# Patient Record
Sex: FEMALE | Race: BLACK OR AFRICAN AMERICAN | Employment: FULL TIME | URBAN - METROPOLITAN AREA
[De-identification: names, ages, dates, MRNs, and addresses within clinical notes are randomized per-mention and may not be internally consistent; named-entity substitution may affect disease eponyms.]

---

## 2023-10-24 ENCOUNTER — TELEPHONE (OUTPATIENT)
Dept: OBSTETRICS AND GYNECOLOGY | Facility: CLINIC | Age: 33
End: 2023-10-24
Payer: COMMERCIAL

## 2023-11-29 ENCOUNTER — OFFICE VISIT (OUTPATIENT)
Dept: OBSTETRICS AND GYNECOLOGY | Facility: CLINIC | Age: 33
End: 2023-11-29
Payer: COMMERCIAL

## 2023-11-29 VITALS
WEIGHT: 118 LBS | BODY MASS INDEX: 18.52 KG/M2 | HEIGHT: 67 IN | SYSTOLIC BLOOD PRESSURE: 94 MMHG | DIASTOLIC BLOOD PRESSURE: 58 MMHG

## 2023-11-29 DIAGNOSIS — Z01.419 ENCOUNTER FOR ANNUAL ROUTINE GYNECOLOGICAL EXAMINATION: Primary | ICD-10-CM

## 2023-11-29 DIAGNOSIS — N94.3 PMS (PREMENSTRUAL SYNDROME): ICD-10-CM

## 2023-11-29 DIAGNOSIS — N94.6 DYSMENORRHEA: ICD-10-CM

## 2023-11-29 DIAGNOSIS — Z11.3 SCREEN FOR SEXUALLY TRANSMITTED DISEASES: ICD-10-CM

## 2023-11-29 DIAGNOSIS — Z11.51 ENCOUNTER FOR SCREENING FOR HUMAN PAPILLOMAVIRUS (HPV): ICD-10-CM

## 2023-11-29 DIAGNOSIS — N76.0 RECURRENT VAGINITIS: ICD-10-CM

## 2023-11-29 DIAGNOSIS — Z72.51 HIGH RISK HETEROSEXUAL BEHAVIOR: ICD-10-CM

## 2023-11-29 DIAGNOSIS — Z20.828 EXPOSURE TO HERPES SIMPLEX VIRUS (HSV): ICD-10-CM

## 2023-11-29 PROCEDURE — 99395 PREV VISIT EST AGE 18-39: CPT | Performed by: OBSTETRICS & GYNECOLOGY

## 2023-11-29 PROCEDURE — 87205 SMEAR GRAM STAIN: CPT | Performed by: OBSTETRICS & GYNECOLOGY

## 2023-11-29 PROCEDURE — 88175 CYTOPATH C/V AUTO FLUID REDO: CPT | Mod: TC | Performed by: OBSTETRICS & GYNECOLOGY

## 2023-11-29 PROCEDURE — 87800 DETECT AGNT MULT DNA DIREC: CPT | Performed by: OBSTETRICS & GYNECOLOGY

## 2023-11-29 PROCEDURE — 87624 HPV HI-RISK TYP POOLED RSLT: CPT | Performed by: OBSTETRICS & GYNECOLOGY

## 2023-11-29 PROCEDURE — 87661 TRICHOMONAS VAGINALIS AMPLIF: CPT | Mod: 59 | Performed by: OBSTETRICS & GYNECOLOGY

## 2023-11-29 PROCEDURE — 1036F TOBACCO NON-USER: CPT | Performed by: OBSTETRICS & GYNECOLOGY

## 2023-11-29 RX ORDER — IBUPROFEN 800 MG/1
800 TABLET ORAL EVERY 8 HOURS PRN
COMMUNITY
Start: 2022-10-19 | End: 2023-11-29 | Stop reason: SDUPTHER

## 2023-11-29 RX ORDER — IBUPROFEN 800 MG/1
800 TABLET ORAL EVERY 8 HOURS PRN
Qty: 30 TABLET | Refills: 3 | Status: SHIPPED | OUTPATIENT
Start: 2023-11-29

## 2023-11-29 RX ORDER — CYCLOBENZAPRINE HCL 10 MG
10 TABLET ORAL 3 TIMES DAILY PRN
COMMUNITY
Start: 2019-09-10

## 2023-11-29 ASSESSMENT — PATIENT HEALTH QUESTIONNAIRE - PHQ9
2. FEELING DOWN, DEPRESSED OR HOPELESS: NOT AT ALL
SUM OF ALL RESPONSES TO PHQ9 QUESTIONS 1 & 2: 0
1. LITTLE INTEREST OR PLEASURE IN DOING THINGS: NOT AT ALL

## 2023-11-29 ASSESSMENT — LIFESTYLE VARIABLES
AUDIT-C TOTAL SCORE: 1
HOW OFTEN DO YOU HAVE A DRINK CONTAINING ALCOHOL: MONTHLY OR LESS
HOW OFTEN DO YOU HAVE SIX OR MORE DRINKS ON ONE OCCASION: NEVER
HOW MANY STANDARD DRINKS CONTAINING ALCOHOL DO YOU HAVE ON A TYPICAL DAY: 1 OR 2
SKIP TO QUESTIONS 9-10: 1

## 2023-11-29 ASSESSMENT — ENCOUNTER SYMPTOMS
FREQUENCY: 0
SHORTNESS OF BREATH: 0
ABDOMINAL PAIN: 0
DIZZINESS: 0
VOMITING: 0
COLOR CHANGE: 0
NAUSEA: 0
UNEXPECTED WEIGHT CHANGE: 0
CHILLS: 0
FATIGUE: 0
HEADACHES: 0
DYSURIA: 0
FEVER: 0
COUGH: 0

## 2023-11-29 ASSESSMENT — PAIN SCALES - GENERAL: PAINLEVEL: 0-NO PAIN

## 2023-11-29 NOTE — PROGRESS NOTES
"Annual  Subjective   Michelle Higgins is a 33 y.o. female who is here for a routine exam.     Complaints: getting extremely emotional prior to menses, tearful/mood changes fast, doesn't want any ocps which have helped in past; recent exposure to HSV, wants tested, not having any outbreaks; h/o recurrent vagintis, dc at times wants all swabs done  Periods: regular  Dysmenorrhea: yes, painful    Current contraception: none  History of abnormal Pap smear: no  History of abnormal mammogram: no      OB History          1    Para   1    Term   1            AB        Living   1         SAB        IAB        Ectopic        Multiple        Live Births                      Review of Systems   Constitutional:  Negative for chills, fatigue, fever and unexpected weight change.   Respiratory:  Negative for cough and shortness of breath.    Gastrointestinal:  Negative for abdominal pain, nausea and vomiting.   Genitourinary:  Positive for menstrual problem and vaginal discharge. Negative for dyspareunia, dysuria, frequency, genital sores and pelvic pain.   Skin:  Negative for color change and rash.   Neurological:  Negative for dizziness and headaches.       Objective   BP 94/58   Ht 1.702 m (5' 7\")   Wt 53.5 kg (118 lb)   LMP 2023 (Exact Date)   BMI 18.48 kg/m²        General:   Alert and oriented, in no acute distress   Neck: Supple. No visible thyromegaly.    Breast/Axilla: Normal to palpation bilaterally without masses, skin changes, or nipple discharge.    Abdomen: Soft, non-tender, without masses or organomegaly   Vulva: Normal architecture without erythema, masses, or lesions.    Vagina: Normal mucosa without lesions, masses, or atrophy. No abnormal vaginal discharge.    Cervix: Normal without masses, lesions, or signs of cervicitis   Uterus: Normal, mobile, non-enlarged uterus   Adnexa: Normal without masses or lesions   Pelvic Floor normal   Psych Normal affect. Normal mood.      Assessment/Plan "   Problem List Items Addressed This Visit    None  Visit Diagnoses         Codes    Encounter for annual routine gynecological examination    -  Primary Z01.419    Relevant Orders    THINPREP PAP TEST    High risk heterosexual behavior     Z72.51    Relevant Orders    C. Trachomatis / N. Gonorrhoeae, Amplified Detection    Trichomonas vaginalis, Amplified    Vaginitis Gram Stain For Bacterial Vaginosis + Yeast    HIV 1/2 Antigen/Antibody Screen with Reflex to Confirmation    HSV1 IgG and HSV2 IgG    HSV Type I/II IgM Antibody    Syphilis Screen with Reflex    Hepatitis C Antibody    Hepatitis B Surface Antigen    Screen for sexually transmitted diseases     Z11.3    Relevant Orders    C. Trachomatis / N. Gonorrhoeae, Amplified Detection    Trichomonas vaginalis, Amplified    Vaginitis Gram Stain For Bacterial Vaginosis + Yeast    HIV 1/2 Antigen/Antibody Screen with Reflex to Confirmation    HSV1 IgG and HSV2 IgG    HSV Type I/II IgM Antibody    Syphilis Screen with Reflex    Hepatitis C Antibody    Hepatitis B Surface Antigen    Exposure to herpes simplex virus (HSV)     Z20.828    Relevant Orders    HSV1 IgG and HSV2 IgG    HSV Type I/II IgM Antibody    Encounter for screening for human papillomavirus (HPV)     Z11.51    Relevant Orders    THINPREP PAP TEST    Recurrent vaginitis     N76.0    Dysmenorrhea     N94.6    PMS (premenstrual syndrome)     N94.3          Discussed with patient continuing high-dose ibuprofen, adding omeprazole should she take continually and having any upper abdominal discomfort.  Patient also discussed other options to help with treatment of painful periods including some additional supplements, TENS unit, heating pad.  Discussed with patient's symptoms are likely more so related to PMS, and not PMDD.  Patient offered medication in preparation for her menses but she declines at this time.  Routine annual    Pap due today      Meghan Hill MD

## 2023-11-30 ENCOUNTER — LAB (OUTPATIENT)
Dept: LAB | Facility: LAB | Age: 33
End: 2023-11-30
Payer: COMMERCIAL

## 2023-11-30 DIAGNOSIS — Z11.3 SCREEN FOR SEXUALLY TRANSMITTED DISEASES: ICD-10-CM

## 2023-11-30 DIAGNOSIS — Z20.828 EXPOSURE TO HERPES SIMPLEX VIRUS (HSV): ICD-10-CM

## 2023-11-30 DIAGNOSIS — Z72.51 HIGH RISK HETEROSEXUAL BEHAVIOR: ICD-10-CM

## 2023-11-30 LAB
CLUE CELLS VAG LPF-#/AREA: NORMAL /[LPF]
NUGENT SCORE: 0
YEAST VAG WET PREP-#/AREA: NORMAL

## 2023-11-30 PROCEDURE — 86780 TREPONEMA PALLIDUM: CPT

## 2023-11-30 PROCEDURE — 86696 HERPES SIMPLEX TYPE 2 TEST: CPT

## 2023-11-30 PROCEDURE — 86694 HERPES SIMPLEX NES ANTBDY: CPT

## 2023-11-30 PROCEDURE — 87389 HIV-1 AG W/HIV-1&-2 AB AG IA: CPT

## 2023-11-30 PROCEDURE — 86803 HEPATITIS C AB TEST: CPT

## 2023-11-30 PROCEDURE — 86695 HERPES SIMPLEX TYPE 1 TEST: CPT

## 2023-11-30 PROCEDURE — 87340 HEPATITIS B SURFACE AG IA: CPT

## 2023-11-30 PROCEDURE — 36415 COLL VENOUS BLD VENIPUNCTURE: CPT

## 2023-12-01 LAB
HBV SURFACE AG SERPL QL IA: NONREACTIVE
HCV AB SER QL: NONREACTIVE
HERPES SIMPLEX VIRUS 1 IGG: 0.3 INDEX
HERPES SIMPLEX VIRUS 2 IGG: <0.2 INDEX
HIV 1+2 AB+HIV1 P24 AG SERPL QL IA: NONREACTIVE
T PALLIDUM AB SER QL: NONREACTIVE

## 2023-12-02 LAB
C TRACH RRNA SPEC QL NAA+PROBE: NEGATIVE
N GONORRHOEA DNA SPEC QL PROBE+SIG AMP: NEGATIVE
T VAGINALIS RRNA SPEC QL NAA+PROBE: NEGATIVE

## 2023-12-04 LAB — HSV1+2 IGM SER IA-ACNC: 0.72 IV

## 2023-12-14 ENCOUNTER — TELEMEDICINE (OUTPATIENT)
Dept: OBSTETRICS AND GYNECOLOGY | Facility: CLINIC | Age: 33
End: 2023-12-14
Payer: COMMERCIAL

## 2023-12-14 DIAGNOSIS — M62.838 MUSCLE SPASM: ICD-10-CM

## 2023-12-14 DIAGNOSIS — N94.6 DYSMENORRHEA: ICD-10-CM

## 2023-12-14 DIAGNOSIS — Z20.828 EXPOSURE TO HERPES SIMPLEX VIRUS (HSV): Primary | ICD-10-CM

## 2023-12-14 PROCEDURE — 99212 OFFICE O/P EST SF 10 MIN: CPT | Mod: GT | Performed by: OBSTETRICS & GYNECOLOGY

## 2023-12-14 PROCEDURE — 99212 OFFICE O/P EST SF 10 MIN: CPT | Performed by: OBSTETRICS & GYNECOLOGY

## 2023-12-14 RX ORDER — CYCLOBENZAPRINE HCL 5 MG
5 TABLET ORAL 3 TIMES DAILY PRN
Qty: 30 TABLET | Refills: 1 | Status: SHIPPED | OUTPATIENT
Start: 2023-12-14 | End: 2023-12-17

## 2023-12-14 ASSESSMENT — ENCOUNTER SYMPTOMS: DYSURIA: 0

## 2023-12-14 NOTE — PROGRESS NOTES
Subjective   Patient ID: Michelle Higgins is a 33 y.o. female who presents for No chief complaint on file..  Pt here to discuss labs: STDs rev'd and all wnl.  Patient at present not having any symptoms of anything concerning at this time.  Patient does not anticipate she would want a recheck of HSV titers in 6 months but she will let us know.  Patient also reports she started her period today and as always has been really intensely painful for her.  Ibuprofen and a muscle relaxant help, patient needs a refill of muscle relaxant today.  Patient is and continues to decline any hormonal forms of help for her menstrual cramps.      Review of Systems   Genitourinary:  Positive for menstrual problem and pelvic pain. Negative for dyspareunia and dysuria.       Objective   Physical Exam  Constitutional:       Appearance: Normal appearance.   Neurological:      Mental Status: She is alert.         Assessment/Plan   Problem List Items Addressed This Visit    None  Visit Diagnoses         Codes    Exposure to herpes simplex virus (HSV)    -  Primary Z20.828    Dysmenorrhea     N94.6    Relevant Medications    cyclobenzaprine (Flexeril) 5 mg tablet    Muscle spasm     M62.838    Relevant Medications    cyclobenzaprine (Flexeril) 5 mg tablet        Patient will call back if she desires repeat testing in 6 months for HSV.  Patient will continue to alternate ibuprofen and muscle relaxant for menstrual cramps.  Patient made aware of alternative treatments and workup for endometriosis should she desire including laparoscopy with possible fulguration of endometriosis should it be found.  Patient expressed understanding and states she will reach out if she desires to proceed in this manner.  Alternative forms to help with menstrual cramps discussed with patient including heat TENS unit and some supplements which patient is in the process of trying.         Meghan Hill MD 12/13/23 10:53 PM

## 2023-12-20 LAB
CYTOLOGY CMNT CVX/VAG CYTO-IMP: NORMAL
HPV HR 12 DNA GENITAL QL NAA+PROBE: POSITIVE
HPV HR GENOTYPES PNL CVX NAA+PROBE: POSITIVE
HPV16 DNA SPEC QL NAA+PROBE: NEGATIVE
HPV18 DNA SPEC QL NAA+PROBE: NEGATIVE
LAB AP HPV GENOTYPE QUESTION: YES
LAB AP HPV HR: NORMAL
LAB AP PAP ADDITIONAL TESTS: NORMAL
LABORATORY COMMENT REPORT: NORMAL
LMP START DATE: NORMAL
PATH REPORT.TOTAL CANCER: NORMAL

## 2024-01-02 ENCOUNTER — TELEMEDICINE (OUTPATIENT)
Dept: OBSTETRICS AND GYNECOLOGY | Facility: CLINIC | Age: 34
End: 2024-01-02
Payer: COMMERCIAL

## 2024-01-02 DIAGNOSIS — R87.618 PAP SMEAR ABNORMALITY OF CERVIX/HUMAN PAPILLOMAVIRUS (HPV) POSITIVE: Primary | ICD-10-CM

## 2024-01-02 PROCEDURE — 99212 OFFICE O/P EST SF 10 MIN: CPT | Mod: GT | Performed by: OBSTETRICS & GYNECOLOGY

## 2024-01-02 PROCEDURE — 99212 OFFICE O/P EST SF 10 MIN: CPT | Performed by: OBSTETRICS & GYNECOLOGY

## 2024-01-02 NOTE — PROGRESS NOTES
Subjective   Patient ID: Michelle Higgins is a 33 y.o. female who presents for No chief complaint on file..  33-year-old female here to discuss results for recent Pap.  Recent Pap showed negative Pap but positive HPV, other.  Patient denies any other symptoms at present and just wanted to discuss for clarity.        Review of Systems   Genitourinary:  Negative for dyspareunia, pelvic pain, vaginal bleeding, vaginal discharge and vaginal pain.       Objective   Physical Exam  Constitutional:       Appearance: Normal appearance.   Neurological:      Mental Status: She is alert.         Assessment/Plan   Problem List Items Addressed This Visit    None  Visit Diagnoses         Codes    Pap smear abnormality of cervix/human papillomavirus (HPV) positive    -  Primary R87.618        Discussed with patient normal course of HPV, encourage condoms HPV vaccination if not already received, as well as repeat HPV and Pap smear testing in 1 year.  Patient encouraged to do things to maximize immune system.         Meghan Hill MD 01/02/24 12:16 PM

## 2024-05-30 ENCOUNTER — TELEPHONE (OUTPATIENT)
Dept: OBSTETRICS AND GYNECOLOGY | Facility: CLINIC | Age: 34
End: 2024-05-30
Payer: COMMERCIAL

## 2024-05-30 NOTE — TELEPHONE ENCOUNTER
Est pt calling with c/o thick white discharge, odor and slight itching and irritation. Diflucan called into pharm on file.

## 2024-06-05 ENCOUNTER — TELEPHONE (OUTPATIENT)
Dept: OBSTETRICS AND GYNECOLOGY | Facility: CLINIC | Age: 34
End: 2024-06-05
Payer: COMMERCIAL

## 2024-06-05 NOTE — TELEPHONE ENCOUNTER
See prev TE; pt treated for yeast on 05/30. Pt states yeast sx's went away but now has vaginal odor and thin discharge. Called in Metronidazole to pharm on file. Advised appt if not better.

## 2024-10-08 ENCOUNTER — TELEPHONE (OUTPATIENT)
Dept: OBSTETRICS AND GYNECOLOGY | Facility: CLINIC | Age: 34
End: 2024-10-08
Payer: COMMERCIAL

## 2024-10-08 NOTE — TELEPHONE ENCOUNTER
EST PT calling in for appt for an STI check. Pt wants to be sure. Denies any symptoms at this time. Appt made for 10/15.

## 2024-10-15 ENCOUNTER — LAB (OUTPATIENT)
Dept: LAB | Facility: LAB | Age: 34
End: 2024-10-15
Payer: COMMERCIAL

## 2024-10-15 ENCOUNTER — OFFICE VISIT (OUTPATIENT)
Dept: OBSTETRICS AND GYNECOLOGY | Facility: CLINIC | Age: 34
End: 2024-10-15
Payer: COMMERCIAL

## 2024-10-15 VITALS
SYSTOLIC BLOOD PRESSURE: 102 MMHG | BODY MASS INDEX: 19.18 KG/M2 | WEIGHT: 122.2 LBS | HEIGHT: 67 IN | DIASTOLIC BLOOD PRESSURE: 65 MMHG

## 2024-10-15 DIAGNOSIS — Z11.3 SCREEN FOR STD (SEXUALLY TRANSMITTED DISEASE): ICD-10-CM

## 2024-10-15 DIAGNOSIS — Z72.51 UNPROTECTED SEX: ICD-10-CM

## 2024-10-15 DIAGNOSIS — Z72.51 UNPROTECTED SEX: Primary | ICD-10-CM

## 2024-10-15 DIAGNOSIS — N92.0 MENORRHAGIA WITH REGULAR CYCLE: ICD-10-CM

## 2024-10-15 LAB
B-HCG SERPL-ACNC: <2 MIU/ML
ERYTHROCYTE [DISTWIDTH] IN BLOOD BY AUTOMATED COUNT: 13 % (ref 11.5–14.5)
HCT VFR BLD AUTO: 35.2 % (ref 36–46)
HGB BLD-MCNC: 11.4 G/DL (ref 12–16)
MCH RBC QN AUTO: 28 PG (ref 26–34)
MCHC RBC AUTO-ENTMCNC: 32.4 G/DL (ref 32–36)
MCV RBC AUTO: 87 FL (ref 80–100)
NRBC BLD-RTO: 0 /100 WBCS (ref 0–0)
PLATELET # BLD AUTO: 209 X10*3/UL (ref 150–450)
RBC # BLD AUTO: 4.07 X10*6/UL (ref 4–5.2)
TSH SERPL-ACNC: 0.68 MIU/L (ref 0.44–3.98)
WBC # BLD AUTO: 3.9 X10*3/UL (ref 4.4–11.3)

## 2024-10-15 PROCEDURE — 84702 CHORIONIC GONADOTROPIN TEST: CPT

## 2024-10-15 PROCEDURE — 86695 HERPES SIMPLEX TYPE 1 TEST: CPT

## 2024-10-15 PROCEDURE — 1036F TOBACCO NON-USER: CPT | Performed by: OBSTETRICS & GYNECOLOGY

## 2024-10-15 PROCEDURE — 87340 HEPATITIS B SURFACE AG IA: CPT

## 2024-10-15 PROCEDURE — 84443 ASSAY THYROID STIM HORMONE: CPT

## 2024-10-15 PROCEDURE — 87661 TRICHOMONAS VAGINALIS AMPLIF: CPT | Mod: WESLAB | Performed by: OBSTETRICS & GYNECOLOGY

## 2024-10-15 PROCEDURE — 99213 OFFICE O/P EST LOW 20 MIN: CPT | Performed by: OBSTETRICS & GYNECOLOGY

## 2024-10-15 PROCEDURE — 86696 HERPES SIMPLEX TYPE 2 TEST: CPT

## 2024-10-15 PROCEDURE — 3008F BODY MASS INDEX DOCD: CPT | Performed by: OBSTETRICS & GYNECOLOGY

## 2024-10-15 PROCEDURE — 36415 COLL VENOUS BLD VENIPUNCTURE: CPT

## 2024-10-15 PROCEDURE — 87389 HIV-1 AG W/HIV-1&-2 AB AG IA: CPT

## 2024-10-15 PROCEDURE — 87491 CHLMYD TRACH DNA AMP PROBE: CPT | Mod: WESLAB | Performed by: OBSTETRICS & GYNECOLOGY

## 2024-10-15 PROCEDURE — 86803 HEPATITIS C AB TEST: CPT

## 2024-10-15 PROCEDURE — 84146 ASSAY OF PROLACTIN: CPT

## 2024-10-15 PROCEDURE — 85027 COMPLETE CBC AUTOMATED: CPT

## 2024-10-15 PROCEDURE — 86780 TREPONEMA PALLIDUM: CPT

## 2024-10-15 PROCEDURE — 87205 SMEAR GRAM STAIN: CPT | Mod: WESLAB | Performed by: OBSTETRICS & GYNECOLOGY

## 2024-10-15 ASSESSMENT — PAIN SCALES - GENERAL: PAINLEVEL: 0-NO PAIN

## 2024-10-15 ASSESSMENT — PATIENT HEALTH QUESTIONNAIRE - PHQ9
SUM OF ALL RESPONSES TO PHQ9 QUESTIONS 1 & 2: 0
2. FEELING DOWN, DEPRESSED OR HOPELESS: NOT AT ALL
1. LITTLE INTEREST OR PLEASURE IN DOING THINGS: NOT AT ALL

## 2024-10-15 ASSESSMENT — SOCIAL DETERMINANTS OF HEALTH (SDOH)
WITHIN THE LAST YEAR, HAVE YOU BEEN AFRAID OF YOUR PARTNER OR EX-PARTNER?: NO
WITHIN THE LAST YEAR, HAVE YOU BEEN KICKED, HIT, SLAPPED, OR OTHERWISE PHYSICALLY HURT BY YOUR PARTNER OR EX-PARTNER?: NO
WITHIN THE LAST YEAR, HAVE YOU BEEN HUMILIATED OR EMOTIONALLY ABUSED IN OTHER WAYS BY YOUR PARTNER OR EX-PARTNER?: NO
WITHIN THE LAST YEAR, HAVE TO BEEN RAPED OR FORCED TO HAVE ANY KIND OF SEXUAL ACTIVITY BY YOUR PARTNER OR EX-PARTNER?: NO

## 2024-10-15 ASSESSMENT — LIFESTYLE VARIABLES
HOW OFTEN DO YOU HAVE A DRINK CONTAINING ALCOHOL: MONTHLY OR LESS
HOW OFTEN DO YOU HAVE SIX OR MORE DRINKS ON ONE OCCASION: LESS THAN MONTHLY
AUDIT-C TOTAL SCORE: 2
SKIP TO QUESTIONS 9-10: 0
HOW MANY STANDARD DRINKS CONTAINING ALCOHOL DO YOU HAVE ON A TYPICAL DAY: 1 OR 2

## 2024-10-15 ASSESSMENT — ENCOUNTER SYMPTOMS
LOSS OF SENSATION IN FEET: 0
OCCASIONAL FEELINGS OF UNSTEADINESS: 0
DEPRESSION: 0

## 2024-10-15 NOTE — PROGRESS NOTES
Subjective   Michelle Higgins is a 34 y.o. who presents for sexually transmitted infection screening. Sexual history reviewed with the patient. STI exposures include  HSV in past, none currently . Patient reports previous history of the following STIs: none. Current symptoms include none.  Uses condoms intermittently. Pt also had a run of yeast infections in the summer and wants to make sure nothing is still there.    Pt also notes worsening dysmenorrhea and heaviness of period. Pt has tried a number of things to help and it does but not significantly. Pt had previously been opposed to hormnonal intervention but may be open to it now.     Social History     Substance and Sexual Activity   Sexual Activity Yes    Partners: Male    Birth control/protection: Condom Male     E-cigarette/Vaping       Questions Responses    E-cigarette/Vaping Use Never User    Passive Exposure No    Counseling Given No          E-cigarette/Vaping Substances       Questions Responses    Nicotine No    THC No    CBD No    Flavoring No          E-cigarette/Vaping Devices       Questions Responses    Disposable No    Pre-filled or Refillable Cartridge No    Refillable Tank No    Pre-filled Pod No          Gynecology History       Questions Responses    Ever had a Pap smear test? Yes    Comment:  Yes on 2023 (Age - 33y)     Date of last Pap test 2023    Comment:  2023 on 10/15/2024 (Age - 34y)     Ever had an abnormal Pap? No    Comment:  No on 2023 (Age - 33y)     Ever had an STI/STD No    Comment:  No on 2023 (Age - 33y)     Hx of Gonorrhea No    Comment:  No on 2023 (Age - 33y)     Hx of Chlamydia No    Comment:  No on 2023 (Age - 33y)     Hx of Syphilis No    Comment:  No on 2023 (Age - 33y)     Hx of Genital Herpes No    Comment:  No on 2023 (Age - 33y)     HIV Positive No    Comment:  No on 2023 (Age - 33y)     Recent HIV/AIDS Exposure No    Comment:  No on 2023 (Age  - 33y)           Exposure Assessment       Questions Responses    Have you or your partner traveled outside the [place] in the past six months? No    Comment:  No on 2023 (Age - 33y)     Recent exposure to chemicals/hazardous material/radiation No    Comment:  No on 2023 (Age - 33y)           Prior Pregnancy Outcomes       Questions Responses    Hx of Gestational Diabetes No    Comment:  No on 2023 (Age - 33y)     Hx of GBS No    Comment:  No on 2023 (Age - 33y)     Hx of  Birth No    Comment:  No on 2023 (Age - 33y)           Surgical History       Questions Responses    Patient Hx of Anesthesia Problems No    Comment:  No on 2023 (Age - 33y)     Family Hx of Anesthesia Problems No    Comment:  No on 2023 (Age - 33y)           Social & Substance History       Questions Responses    Any dietary restrictions or needs? No    Comment:  No on 2023 (Age - 33y)     Problems (transportation/job) prevent patient from keeping healthcare appointments No    Comment:  No on 2023 (Age - 33y)     Do you feel unsafe where you live? No    Comment:  No on 2023 (Age - 33y)     In the past year, have you been threatened, hit, slapped, or kicked by anyone you know? No    Comment:  No on 2023 (Age - 33y)     Has anyone forced you to perform any sexual act that you did not want to do? No    Comment:  No on 2023 (Age - 33y)     Are you exposed to second-hand smoke? No    Comment:  No on 2023 (Age - 33y)     Did you drink alcohol before your pregnancy? No    Comment:  No on 2023 (Age - 33y)     Did you stop drinking alcohol when you learned about the pregnancy? No    Comment:  No on 2023 (Age - 33y)     Do you drink alcoholic beverages now? No    Comment:  No on 2023 (Age - 33y)     Do you have a history or current issues of substance abuse? No    Comment:  No on 2023 (Age - 33y)             Objective   Physical Exam  Constitutional:        Appearance: Normal appearance.   HENT:      Head: Normocephalic and atraumatic.   Genitourinary:     General: Normal vulva.      Vagina: Normal.      Cervix: Normal.   Skin:     General: Skin is warm and dry.   Neurological:      General: No focal deficit present.      Mental Status: She is alert.   Psychiatric:         Attention and Perception: Attention and perception normal.         Mood and Affect: Mood and affect normal.         Speech: Speech normal.         Behavior: Behavior is cooperative.         Assessment/Plan   Problem List Items Addressed This Visit    None  Visit Diagnoses         Codes    Unprotected sex    -  Primary Z72.51    Relevant Orders    C. trachomatis / N. gonorrhoeae, Amplified    HIV 1/2 Antigen/Antibody Screen with Reflex to Confirmation    Hepatitis B Surface Antigen    Hepatitis C Antibody    Syphilis Screen with Reflex    Trichomonas vaginalis, Amplified    Vaginitis Gram Stain For Bacterial Vaginosis + Yeast    HSV1 IgG and HSV2 IgG    Screen for STD (sexually transmitted disease)     Z11.3    Relevant Orders    C. trachomatis / N. gonorrhoeae, Amplified    HIV 1/2 Antigen/Antibody Screen with Reflex to Confirmation    Hepatitis B Surface Antigen    Hepatitis C Antibody    Syphilis Screen with Reflex    Trichomonas vaginalis, Amplified    Vaginitis Gram Stain For Bacterial Vaginosis + Yeast    HSV1 IgG and HSV2 IgG    Menorrhagia with regular cycle     N92.0    Relevant Orders    CBC    TSH with reflex to Free T4 if abnormal    Human Chorionic Gonadotropin, Serum Quantitative    Prolactin    US PELVIS TRANSABDOMINAL WITH TRANSVAGINAL

## 2024-10-16 LAB
C TRACH RRNA SPEC QL NAA+PROBE: NEGATIVE
HBV SURFACE AG SERPL QL IA: NONREACTIVE
HCV AB SER QL: NONREACTIVE
HERPES SIMPLEX VIRUS 1 IGG: 0.3 INDEX
HERPES SIMPLEX VIRUS 2 IGG: <0.2 INDEX
HIV 1+2 AB+HIV1 P24 AG SERPL QL IA: NONREACTIVE
N GONORRHOEA DNA SPEC QL PROBE+SIG AMP: NEGATIVE
PROLACTIN SERPL-MCNC: 3.5 UG/L (ref 3–20)
T VAGINALIS RRNA SPEC QL NAA+PROBE: NEGATIVE
TREPONEMA PALLIDUM IGG+IGM AB [PRESENCE] IN SERUM OR PLASMA BY IMMUNOASSAY: NONREACTIVE

## 2024-10-20 LAB
CLUE CELLS VAG LPF-#/AREA: ABNORMAL /[LPF]
NUGENT SCORE: 8
YEAST VAG WET PREP-#/AREA: ABNORMAL

## 2024-10-21 ENCOUNTER — HOSPITAL ENCOUNTER (OUTPATIENT)
Dept: RADIOLOGY | Facility: HOSPITAL | Age: 34
Discharge: HOME | End: 2024-10-21
Payer: COMMERCIAL

## 2024-10-21 DIAGNOSIS — B96.89 BV (BACTERIAL VAGINOSIS): Primary | ICD-10-CM

## 2024-10-21 DIAGNOSIS — N76.0 BV (BACTERIAL VAGINOSIS): Primary | ICD-10-CM

## 2024-10-21 DIAGNOSIS — N92.0 MENORRHAGIA WITH REGULAR CYCLE: ICD-10-CM

## 2024-10-21 PROCEDURE — 76856 US EXAM PELVIC COMPLETE: CPT

## 2024-10-21 PROCEDURE — 76856 US EXAM PELVIC COMPLETE: CPT | Performed by: STUDENT IN AN ORGANIZED HEALTH CARE EDUCATION/TRAINING PROGRAM

## 2024-10-21 PROCEDURE — 76830 TRANSVAGINAL US NON-OB: CPT | Performed by: STUDENT IN AN ORGANIZED HEALTH CARE EDUCATION/TRAINING PROGRAM

## 2024-10-21 RX ORDER — METRONIDAZOLE 500 MG/1
500 TABLET ORAL 2 TIMES DAILY
Qty: 14 TABLET | Refills: 0 | Status: SHIPPED | OUTPATIENT
Start: 2024-10-21 | End: 2024-10-28

## 2024-11-07 ENCOUNTER — TELEMEDICINE (OUTPATIENT)
Dept: OBSTETRICS AND GYNECOLOGY | Facility: CLINIC | Age: 34
End: 2024-11-07

## 2024-11-07 DIAGNOSIS — N92.0 MENORRHAGIA WITH REGULAR CYCLE: Primary | ICD-10-CM

## 2024-11-07 PROCEDURE — 1036F TOBACCO NON-USER: CPT | Performed by: OBSTETRICS & GYNECOLOGY

## 2024-11-07 PROCEDURE — 99213 OFFICE O/P EST LOW 20 MIN: CPT | Performed by: OBSTETRICS & GYNECOLOGY

## 2024-11-07 RX ORDER — TRANEXAMIC ACID 650 MG/1
1300 TABLET ORAL 3 TIMES DAILY
Qty: 30 TABLET | Refills: 2 | Status: SHIPPED | OUTPATIENT
Start: 2024-11-07 | End: 2024-11-12

## 2024-11-07 NOTE — PROGRESS NOTES
Consent:  Verbal consent was requested and obtained from patient on this date for a telehealth visit to determine if a office visit would be necessary for the patient's complaint(s).      Subjective    HPI: Patient presents for televisit to discuss most recent labs and ultrasound results.  Ultrasound showing possible prominent parametrial vessels but otherwise normal ultrasound, dominant left ovarian follicle.    Past Medical History:   Diagnosis Date    Scoliosis         No past surgical history on file.     Social History     Tobacco Use    Smoking status: Never    Smokeless tobacco: Never   Vaping Use    Vaping status: Never Used   Substance Use Topics    Alcohol use: Yes     Alcohol/week: 1.0 standard drink of alcohol     Types: 1 Glasses of wine per week    Drug use: Yes     Types: Marijuana        Current Outpatient Medications   Medication Instructions    cyclobenzaprine (FLEXERIL) 10 mg, oral, 3 times daily PRN    cyclobenzaprine (FLEXERIL) 5 mg, oral, 3 times daily PRN    ibuprofen 800 mg, oral, Every 8 hours PRN    tranexamic acid (LYSTEDA) 1,300 mg, oral, 3 times daily      Objective    BSA: There is no height or weight on file to calculate BSA.  There were no vitals taken for this visit.     Physical Exam  General: AAOx3 in NAD   Psych: mood and affect are appropiate. Able to consent.     Assessment/Plan         Problem List Items Addressed This Visit    None  Visit Diagnoses         Codes    Menorrhagia with regular cycle    -  Primary N92.0        Patient will start the Emy to help with her heavy menses.  Patient reluctant to start hormonal methods but discussed may be next option for patient.     Treatment Plans         All new and/or current medications discussed and reviewed including side effects with patient/caregiver, Understanding:  Caregiver/Patient expressed understanding., Adherence:  Barriers to adherence identified and discussed if present.

## 2024-11-21 ENCOUNTER — TELEPHONE (OUTPATIENT)
Dept: OBSTETRICS AND GYNECOLOGY | Facility: CLINIC | Age: 34
End: 2024-11-21

## 2024-11-21 NOTE — TELEPHONE ENCOUNTER
Est pt calling with c/o yeast infection sx's due to using new soap. Called in Diflucan to pharm on file. Advised appt if not better.

## 2024-12-22 ENCOUNTER — TELEPHONE (OUTPATIENT)
Dept: OBSTETRICS AND GYNECOLOGY | Facility: HOSPITAL | Age: 34
End: 2024-12-22

## 2025-02-03 ENCOUNTER — INITIAL PRENATAL (OUTPATIENT)
Dept: OBSTETRICS AND GYNECOLOGY | Facility: CLINIC | Age: 35
End: 2025-02-03
Payer: COMMERCIAL

## 2025-02-03 VITALS
DIASTOLIC BLOOD PRESSURE: 65 MMHG | BODY MASS INDEX: 20.37 KG/M2 | WEIGHT: 129.8 LBS | OXYGEN SATURATION: 100 % | HEART RATE: 80 BPM | SYSTOLIC BLOOD PRESSURE: 114 MMHG | HEIGHT: 67 IN

## 2025-02-03 DIAGNOSIS — Z32.01 ENCOUNTER FOR PREGNANCY TEST, RESULT POSITIVE (HHS-HCC): ICD-10-CM

## 2025-02-03 DIAGNOSIS — O36.80X0 PREGNANCY WITH INCONCLUSIVE FETAL VIABILITY, SINGLE OR UNSPECIFIED FETUS: ICD-10-CM

## 2025-02-03 DIAGNOSIS — O09.521 MULTIGRAVIDA OF ADVANCED MATERNAL AGE IN FIRST TRIMESTER (HHS-HCC): ICD-10-CM

## 2025-02-03 DIAGNOSIS — Z34.90 PRENATAL CARE, ANTEPARTUM (HHS-HCC): ICD-10-CM

## 2025-02-03 DIAGNOSIS — N91.4 SECONDARY OLIGOMENORRHEA: Primary | ICD-10-CM

## 2025-02-03 DIAGNOSIS — Z13.79 GENETIC TESTING: ICD-10-CM

## 2025-02-03 LAB
POC APPEARANCE, URINE: CLEAR
POC BILIRUBIN, URINE: NEGATIVE
POC BLOOD, URINE: NEGATIVE
POC COLOR, URINE: YELLOW
POC GLUCOSE, URINE: NEGATIVE MG/DL
POC KETONES, URINE: NEGATIVE MG/DL
POC LEUKOCYTES, URINE: NEGATIVE
POC NITRITE,URINE: NEGATIVE
POC PH, URINE: 6.5 PH
POC PROTEIN, URINE: ABNORMAL MG/DL
POC SPECIFIC GRAVITY, URINE: 1.02
POC UROBILINOGEN, URINE: 0.2 EU/DL
PREGNANCY TEST URINE, POC: POSITIVE

## 2025-02-03 PROCEDURE — 81025 URINE PREGNANCY TEST: CPT | Performed by: OBSTETRICS & GYNECOLOGY

## 2025-02-03 PROCEDURE — 76817 TRANSVAGINAL US OBSTETRIC: CPT | Performed by: OBSTETRICS & GYNECOLOGY

## 2025-02-03 PROCEDURE — 81003 URINALYSIS AUTO W/O SCOPE: CPT | Mod: QW | Performed by: OBSTETRICS & GYNECOLOGY

## 2025-02-03 PROCEDURE — 99214 OFFICE O/P EST MOD 30 MIN: CPT | Performed by: OBSTETRICS & GYNECOLOGY

## 2025-02-03 ASSESSMENT — ENCOUNTER SYMPTOMS
OCCASIONAL FEELINGS OF UNSTEADINESS: 0
LOSS OF SENSATION IN FEET: 0
DEPRESSION: 0

## 2025-02-03 ASSESSMENT — PAIN SCALES - GENERAL: PAINLEVEL_OUTOF10: 0 - NO PAIN

## 2025-02-03 ASSESSMENT — PAIN - FUNCTIONAL ASSESSMENT: PAIN_FUNCTIONAL_ASSESSMENT: 0-10

## 2025-02-03 NOTE — PROGRESS NOTES
Subjective   Patient ID [id number]   Michelle Higgins is a 34 y.o.  at 8w2d with a working estimated date of delivery of 2025, by Last Menstrual Period who presents for an initial prenatal visit.     Her pregnancy is complicated by:  Scoliosis, no rods, no prob w/getting epidural last pregnancy  AMA at time of delivery, NIPT pend    OB History    Para Term  AB Living   2 1 1     1   SAB IAB Ectopic Multiple Live Births           1      # Outcome Date GA Lbr Jaskaran/2nd Weight Sex Type Anes PTL Lv   2 Current            1 Term 17 39w3d  3.912 kg F Vag-Spont EPI N SABI      Birth Comments: Mother A+, GBS negativeNBS WNL          Objective   Physical Exam  Weight: 58.9 kg (129 lb 12.8 oz)  Pregravid BMI: Could not be calculated  BP: 114/65          PROCEDURE:  OB/GYN Transvaginal U/S    Intrauterine - yes  Yolk Sac - yes  Fetal Pole- single  FHT  yes  EDC  2025  CRL  8w0d  Impression: single live IUP, CRL c/w LMP will use JYOTI based on LMP     OBGyn Exam    General Physical Exam    HEENT: normal Heart: normal Skin: normal   Thyroid: normal Lungs: normal Extremities: normal   Lymph Nodes: normal Breasts: normal Neurological: normal   Abdomen: normal        Pelvic Exam    Vulva: normal Vagina: normal   Cervix: normal Adnexa: normal   Rectum: normal Spines: average   Subpubic Arch: normal       Prenatal Labs  Results for orders placed or performed in visit on 25   POCT UA Automated manually resulted    Collection Time: 25  9:52 AM   Result Value Ref Range    POC Color, Urine Yellow Straw, Yellow, Light-Yellow    POC Appearance, Urine Clear Clear    POC Glucose, Urine NEGATIVE NEGATIVE mg/dl    POC Bilirubin, Urine NEGATIVE NEGATIVE    POC Ketones, Urine NEGATIVE NEGATIVE mg/dl    POC Specific Gravity, Urine 1.025 1.005 - 1.035    POC Blood, Urine NEGATIVE NEGATIVE    POC PH, Urine 6.5 No Reference Range Established PH    POC Protein, Urine TRACE (A) NEGATIVE mg/dl    POC  Urobilinogen, Urine 0.2 0.2, 1.0 EU/DL    Poc Nitrite, Urine NEGATIVE NEGATIVE    POC Leukocytes, Urine NEGATIVE NEGATIVE   POCT pregnancy, urine manually resulted    Collection Time: 02/03/25  9:52 AM   Result Value Ref Range    Preg Test, Ur Positive (A) Negative         Assessment/Plan   Problem List Items Addressed This Visit    None  Visit Diagnoses         Codes    Secondary oligomenorrhea    -  Primary N91.4    Relevant Orders    POCT pregnancy, urine manually resulted (Completed)    Encounter for pregnancy test, result positive ([facility])     Z32.01    Relevant Orders    POCT UA Automated manually resulted (Completed)    Urine culture    C. trachomatis / N. gonorrhoeae, Amplified, Urogenital    Pregnancy with inconclusive fetal viability, single or unspecified fetus     O36.80X0    Relevant Orders    Point of Care Ultrasound (Completed)    Prenatal care, antepartum ([facility])     Z34.90    Relevant Orders    CBC Anemia Panel With Reflex,Pregnancy    Hepatitis B surface antigen    Hepatitis C antibody    HIV 1/2 Antigen/Antibody Screen with Reflex to Confirmation    Rubella Antibody, IgG    Syphilis Screen with Reflex    Hgb  A1C    Type And Screen Is this order related to pregnancy or an upcoming surgery? No    Varicella Zoster Antibody, IgG    US OB NT (NUCHAL translucency)    US MAC OB imaging order    Multigravida of advanced maternal age in first trimester ([facility])     O09.521    Relevant Orders    Cre[facility] Prequel Prenatal Screen    Genetic testing     Z13.79    Relevant Orders    Cre[facility] Foresight Carrier Screen            Immunizations: discussed, pt declines  Prenatal Labs ordered  Daily prenatal vitamins prescribed  First trimester screening and second trimester screening discussed.  Follow up in 4 weeks for return OB visit.

## 2025-02-05 LAB
BACTERIA UR CULT: NORMAL
C TRACH RRNA SPEC QL NAA+PROBE: NOT DETECTED
N GONORRHOEA RRNA SPEC QL NAA+PROBE: NOT DETECTED
QUEST GC CT AMPLIFIED (ALWAYS MESSAGE): NORMAL

## 2025-02-06 ENCOUNTER — TELEPHONE (OUTPATIENT)
Dept: OBSTETRICS AND GYNECOLOGY | Facility: CLINIC | Age: 35
End: 2025-02-06
Payer: COMMERCIAL

## 2025-02-06 NOTE — TELEPHONE ENCOUNTER
Est OB around 8wks calling for medication for nausea. Pt has not yet tried anything. Reviewed protocol unisom/vit b6.

## 2025-02-19 ENCOUNTER — TELEPHONE (OUTPATIENT)
Dept: OBSTETRICS AND GYNECOLOGY | Facility: CLINIC | Age: 35
End: 2025-02-19
Payer: COMMERCIAL

## 2025-02-19 DIAGNOSIS — O21.9 NAUSEA AND VOMITING IN PREGNANCY PRIOR TO 22 WEEKS GESTATION: Primary | ICD-10-CM

## 2025-02-19 RX ORDER — ONDANSETRON 4 MG/1
4 TABLET, FILM COATED ORAL EVERY 8 HOURS PRN
Qty: 30 TABLET | Refills: 1 | Status: SHIPPED | OUTPATIENT
Start: 2025-02-19 | End: 2025-03-21

## 2025-02-19 NOTE — TELEPHONE ENCOUNTER
Est OB around 10wks calling stating Unisom and Vitamin B6 is not working, she has tried it for the last 2wks with no relief. Pt asking for rx to be sent to pharm.

## 2025-03-04 ENCOUNTER — HOSPITAL ENCOUNTER (OUTPATIENT)
Dept: RADIOLOGY | Facility: CLINIC | Age: 35
Discharge: HOME | End: 2025-03-04
Payer: COMMERCIAL

## 2025-03-04 DIAGNOSIS — Z34.90 PRENATAL CARE, ANTEPARTUM (HHS-HCC): ICD-10-CM

## 2025-03-04 PROCEDURE — 76813 OB US NUCHAL MEAS 1 GEST: CPT

## 2025-03-13 ENCOUNTER — LAB (OUTPATIENT)
Dept: LAB | Facility: HOSPITAL | Age: 35
End: 2025-03-13
Payer: COMMERCIAL

## 2025-03-13 ENCOUNTER — APPOINTMENT (OUTPATIENT)
Dept: LAB | Facility: HOSPITAL | Age: 35
End: 2025-03-13
Payer: COMMERCIAL

## 2025-03-13 ENCOUNTER — ROUTINE PRENATAL (OUTPATIENT)
Dept: OBSTETRICS AND GYNECOLOGY | Facility: CLINIC | Age: 35
End: 2025-03-13
Payer: COMMERCIAL

## 2025-03-13 VITALS
WEIGHT: 130.4 LBS | SYSTOLIC BLOOD PRESSURE: 97 MMHG | HEART RATE: 112 BPM | BODY MASS INDEX: 20.47 KG/M2 | DIASTOLIC BLOOD PRESSURE: 69 MMHG | HEIGHT: 67 IN | OXYGEN SATURATION: 99 %

## 2025-03-13 DIAGNOSIS — O09.521 MULTIGRAVIDA OF ADVANCED MATERNAL AGE IN FIRST TRIMESTER (HHS-HCC): Primary | ICD-10-CM

## 2025-03-13 DIAGNOSIS — Z3A.13 13 WEEKS GESTATION OF PREGNANCY (HHS-HCC): ICD-10-CM

## 2025-03-13 DIAGNOSIS — O21.9 NAUSEA AND VOMITING IN PREGNANCY PRIOR TO 22 WEEKS GESTATION: ICD-10-CM

## 2025-03-13 LAB
ABO GROUP (TYPE) IN BLOOD: NORMAL
ANTIBODY SCREEN: NORMAL
ERYTHROCYTE [DISTWIDTH] IN BLOOD BY AUTOMATED COUNT: 13.5 % (ref 11.5–14.5)
HCT VFR BLD AUTO: 29.8 % (ref 36–46)
HGB BLD-MCNC: 9.5 G/DL (ref 12–16)
MCH RBC QN AUTO: 28.3 PG (ref 26–34)
MCHC RBC AUTO-ENTMCNC: 31.9 G/DL (ref 32–36)
MCV RBC AUTO: 89 FL (ref 80–100)
NRBC BLD-RTO: 0 /100 WBCS (ref 0–0)
PLATELET # BLD AUTO: 186 X10*3/UL (ref 150–450)
POC APPEARANCE, URINE: CLEAR
POC BILIRUBIN, URINE: NEGATIVE
POC BLOOD, URINE: NEGATIVE
POC COLOR, URINE: YELLOW
POC GLUCOSE, URINE: NEGATIVE MG/DL
POC KETONES, URINE: ABNORMAL MG/DL
POC LEUKOCYTES, URINE: ABNORMAL
POC NITRITE,URINE: NEGATIVE
POC PH, URINE: 5.5 PH
POC PROTEIN, URINE: ABNORMAL MG/DL
POC SPECIFIC GRAVITY, URINE: >=1.03
POC UROBILINOGEN, URINE: 1 EU/DL
RBC # BLD AUTO: 3.36 X10*6/UL (ref 4–5.2)
RH FACTOR (ANTIGEN D): NORMAL
WBC # BLD AUTO: 3.9 X10*3/UL (ref 4.4–11.3)

## 2025-03-13 PROCEDURE — 82728 ASSAY OF FERRITIN: CPT

## 2025-03-13 PROCEDURE — 85027 COMPLETE CBC AUTOMATED: CPT

## 2025-03-13 PROCEDURE — 81003 URINALYSIS AUTO W/O SCOPE: CPT | Performed by: OBSTETRICS & GYNECOLOGY

## 2025-03-13 PROCEDURE — 86850 RBC ANTIBODY SCREEN: CPT

## 2025-03-13 PROCEDURE — 82746 ASSAY OF FOLIC ACID SERUM: CPT

## 2025-03-13 PROCEDURE — 82607 VITAMIN B-12: CPT

## 2025-03-13 PROCEDURE — 86901 BLOOD TYPING SEROLOGIC RH(D): CPT

## 2025-03-13 PROCEDURE — 99213 OFFICE O/P EST LOW 20 MIN: CPT | Mod: TH | Performed by: OBSTETRICS & GYNECOLOGY

## 2025-03-13 PROCEDURE — 99213 OFFICE O/P EST LOW 20 MIN: CPT | Performed by: OBSTETRICS & GYNECOLOGY

## 2025-03-13 PROCEDURE — 86900 BLOOD TYPING SEROLOGIC ABO: CPT

## 2025-03-13 PROCEDURE — 83550 IRON BINDING TEST: CPT

## 2025-03-13 RX ORDER — PROMETHAZINE HYDROCHLORIDE 25 MG/1
25 TABLET ORAL EVERY 6 HOURS PRN
Qty: 30 TABLET | Refills: 1 | Status: SHIPPED | OUTPATIENT
Start: 2025-03-13

## 2025-03-13 ASSESSMENT — LIFESTYLE VARIABLES
HOW OFTEN DO YOU HAVE A DRINK CONTAINING ALCOHOL: NEVER
SKIP TO QUESTIONS 9-10: 1
AUDIT-C TOTAL SCORE: 0
HOW MANY STANDARD DRINKS CONTAINING ALCOHOL DO YOU HAVE ON A TYPICAL DAY: PATIENT DOES NOT DRINK
HOW OFTEN DO YOU HAVE SIX OR MORE DRINKS ON ONE OCCASION: NEVER

## 2025-03-13 ASSESSMENT — PATIENT HEALTH QUESTIONNAIRE - PHQ9
2. FEELING DOWN, DEPRESSED OR HOPELESS: NOT AT ALL
1. LITTLE INTEREST OR PLEASURE IN DOING THINGS: NOT AT ALL
SUM OF ALL RESPONSES TO PHQ9 QUESTIONS 1 & 2: 0

## 2025-03-13 ASSESSMENT — ENCOUNTER SYMPTOMS
DEPRESSION: 0
OCCASIONAL FEELINGS OF UNSTEADINESS: 0
LOSS OF SENSATION IN FEET: 0

## 2025-03-13 ASSESSMENT — PAIN - FUNCTIONAL ASSESSMENT: PAIN_FUNCTIONAL_ASSESSMENT: 0-10

## 2025-03-13 ASSESSMENT — PAIN SCALES - GENERAL: PAINLEVEL_OUTOF10: 0 - NO PAIN

## 2025-03-13 NOTE — PROGRESS NOTES
Subjective   Patient ID [id number]   Michelle Higgins is a 34 y.o.  at 13w5d with a working estimated date of delivery of 2025, by Last Menstrual Period who presents for a routine prenatal visit.     Her pregnancy is complicated by:  Scoliosis, no rods, no prob w/getting epidural last pregnancy  AMA at time of delivery, NIPT pend  Increased risk of preE, start asa 12-16wks    Objective   Physical Exam  Weight: 59.1 kg (130 lb 6.4 oz)  Pregravid BMI: 20.20  BP: 97/69  Fetal Heart Rate: 165               Prenatal Labs  Urine dip:  Results for orders placed or performed in visit on 25   Urine culture    Collection Time: 25  9:30 AM    Specimen: Clean Catch/Voided; Urine   Result Value Ref Range    CULTURE, URINE, ROUTINE SEE NOTE    C. trachomatis / N. gonorrhoeae, Amplified, Urogenital    Collection Time: 25  9:30 AM   Result Value Ref Range    CHLAMYDIA TRACHOMATIS RNA, TMA, UROGENITAL NOT DETECTED NOT DETECTED    NEISSERIA GONORRHOEAE RNA, TMA, UROGENITAL NOT DETECTED NOT DETECTED    (Always Message)     POCT UA Automated manually resulted    Collection Time: 25  9:52 AM   Result Value Ref Range    POC Color, Urine Yellow Straw, Yellow, Light-Yellow    POC Appearance, Urine Clear Clear    POC Glucose, Urine NEGATIVE NEGATIVE mg/dl    POC Bilirubin, Urine NEGATIVE NEGATIVE    POC Ketones, Urine NEGATIVE NEGATIVE mg/dl    POC Specific Gravity, Urine 1.025 1.005 - 1.035    POC Blood, Urine NEGATIVE NEGATIVE    POC PH, Urine 6.5 No Reference Range Established PH    POC Protein, Urine TRACE (A) NEGATIVE mg/dl    POC Urobilinogen, Urine 0.2 0.2, 1.0 EU/DL    Poc Nitrite, Urine NEGATIVE NEGATIVE    POC Leukocytes, Urine NEGATIVE NEGATIVE   POCT pregnancy, urine manually resulted    Collection Time: 25  9:52 AM   Result Value Ref Range    Preg Test, Ur Positive (A) Negative       Assessment/Plan   {Assess/Plan SmartLinks (Optional):19106}    Continue prenatal vitamin.  Labs  reviewed.  Schedule anatomy ultrasound.  Follow up in 4 weeks for a routine prenatal visit.

## 2025-03-14 LAB
COMMENTS - MP RESULT TYPE: NORMAL
COMMENTS - MP RESULT TYPE: NORMAL
EST. AVERAGE GLUCOSE BLD GHB EST-MCNC: 100 MG/DL
EST. AVERAGE GLUCOSE BLD GHB EST-SCNC: 5.5 MMOL/L
FERRITIN SERPL-MCNC: 265 NG/ML
FOLATE SERPL-MCNC: 14.5 NG/ML
HBA1C MFR BLD: 5.1 % OF TOTAL HGB
IRON SATN MFR SERPL: 49 %
IRON SERPL-MCNC: 173 UG/DL
REFLEX ADDED, ANEMIA PANEL: NORMAL
SCAN RESULT: NORMAL
SCAN RESULT: NORMAL
TIBC SERPL-MCNC: 356 UG/DL
UIBC SERPL-MCNC: 183 UG/DL
VIT B12 SERPL-MCNC: 375 PG/ML
VZV IGG SER IA-ACNC: 4.2 S/CO

## 2025-04-17 ENCOUNTER — ROUTINE PRENATAL (OUTPATIENT)
Dept: OBSTETRICS AND GYNECOLOGY | Facility: CLINIC | Age: 35
End: 2025-04-17
Payer: COMMERCIAL

## 2025-04-17 VITALS
HEIGHT: 67 IN | BODY MASS INDEX: 21.91 KG/M2 | SYSTOLIC BLOOD PRESSURE: 115 MMHG | DIASTOLIC BLOOD PRESSURE: 74 MMHG | WEIGHT: 139.6 LBS

## 2025-04-17 DIAGNOSIS — M54.9 BACK PAIN AFFECTING PREGNANCY: ICD-10-CM

## 2025-04-17 DIAGNOSIS — O09.522 MULTIGRAVIDA OF ADVANCED MATERNAL AGE IN SECOND TRIMESTER (HHS-HCC): Primary | ICD-10-CM

## 2025-04-17 DIAGNOSIS — M41.27 OTHER IDIOPATHIC SCOLIOSIS, LUMBOSACRAL REGION: ICD-10-CM

## 2025-04-17 DIAGNOSIS — O99.891 BACK PAIN AFFECTING PREGNANCY: ICD-10-CM

## 2025-04-17 DIAGNOSIS — Z3A.18 18 WEEKS GESTATION OF PREGNANCY (HHS-HCC): ICD-10-CM

## 2025-04-17 LAB
POC APPEARANCE, URINE: CLEAR
POC BILIRUBIN, URINE: NEGATIVE
POC BLOOD, URINE: NEGATIVE
POC COLOR, URINE: YELLOW
POC GLUCOSE, URINE: NEGATIVE MG/DL
POC KETONES, URINE: NEGATIVE MG/DL
POC LEUKOCYTES, URINE: ABNORMAL
POC NITRITE,URINE: NEGATIVE
POC PH, URINE: 5.5 PH
POC PROTEIN, URINE: ABNORMAL MG/DL
POC SPECIFIC GRAVITY, URINE: >=1.03
POC UROBILINOGEN, URINE: 0.2 EU/DL

## 2025-04-17 PROCEDURE — 99213 OFFICE O/P EST LOW 20 MIN: CPT | Performed by: OBSTETRICS & GYNECOLOGY

## 2025-04-17 PROCEDURE — 81003 URINALYSIS AUTO W/O SCOPE: CPT | Mod: QW | Performed by: OBSTETRICS & GYNECOLOGY

## 2025-04-17 PROCEDURE — 99213 OFFICE O/P EST LOW 20 MIN: CPT | Mod: TH | Performed by: OBSTETRICS & GYNECOLOGY

## 2025-04-17 ASSESSMENT — ANXIETY QUESTIONNAIRES
7. FEELING AFRAID AS IF SOMETHING AWFUL MIGHT HAPPEN: NOT AT ALL
4. TROUBLE RELAXING: NOT AT ALL
3. WORRYING TOO MUCH ABOUT DIFFERENT THINGS: NOT AT ALL
GAD7 TOTAL SCORE: 3
1. FEELING NERVOUS, ANXIOUS, OR ON EDGE: SEVERAL DAYS
5. BEING SO RESTLESS THAT IT IS HARD TO SIT STILL: NOT AT ALL
IF YOU CHECKED OFF ANY PROBLEMS ON THIS QUESTIONNAIRE, HOW DIFFICULT HAVE THESE PROBLEMS MADE IT FOR YOU TO DO YOUR WORK, TAKE CARE OF THINGS AT HOME, OR GET ALONG WITH OTHER PEOPLE: SOMEWHAT DIFFICULT
6. BECOMING EASILY ANNOYED OR IRRITABLE: MORE THAN HALF THE DAYS
2. NOT BEING ABLE TO STOP OR CONTROL WORRYING: NOT AT ALL

## 2025-04-17 ASSESSMENT — PAIN SCALES - GENERAL: PAINLEVEL_OUTOF10: 0 - NO PAIN

## 2025-04-17 ASSESSMENT — LIFESTYLE VARIABLES
AUDIT-C TOTAL SCORE: 0
HOW OFTEN DO YOU HAVE A DRINK CONTAINING ALCOHOL: NEVER
SKIP TO QUESTIONS 9-10: 1
HOW MANY STANDARD DRINKS CONTAINING ALCOHOL DO YOU HAVE ON A TYPICAL DAY: PATIENT DOES NOT DRINK
HOW OFTEN DO YOU HAVE SIX OR MORE DRINKS ON ONE OCCASION: NEVER

## 2025-04-17 ASSESSMENT — ENCOUNTER SYMPTOMS
OCCASIONAL FEELINGS OF UNSTEADINESS: 0
DEPRESSION: 0
LOSS OF SENSATION IN FEET: 0

## 2025-04-17 ASSESSMENT — PAIN - FUNCTIONAL ASSESSMENT: PAIN_FUNCTIONAL_ASSESSMENT: 0-10

## 2025-04-17 NOTE — PROGRESS NOTES
Subjective   Patient ID [id number]   Michelle Higgins is a 34 y.o.  at 18w5d with a working estimated date of delivery of 2025, by Last Menstrual Period who presents for a routine prenatal visit.     Her pregnancy is complicated by:  Scoliosis, no rods, no prob w/getting epidural last pregnancy; getting some low back pain, tingling in limbs now  AMA at time of delivery, NIPT neg  Increased risk of preE, start asa 12-16wks  Alpha thalassemia carrier, FOB pend    Objective   Physical Exam  Weight: 63.3 kg (139 lb 9.6 oz)  Pregravid BMI: 20.20  BP: 115/74  Fetal Heart Rate: 155               Prenatal Labs  Urine dip:  Results for orders placed or performed in visit on 25   POCT UA Automated manually resulted    Collection Time: 25  9:35 AM   Result Value Ref Range    POC Color, Urine Yellow Straw, Yellow, Light-Yellow    POC Appearance, Urine Clear Clear    POC Glucose, Urine NEGATIVE NEGATIVE mg/dl    POC Bilirubin, Urine NEGATIVE NEGATIVE    POC Ketones, Urine NEGATIVE NEGATIVE mg/dl    POC Specific Gravity, Urine >=1.030 1.005 - 1.035    POC Blood, Urine NEGATIVE NEGATIVE    POC PH, Urine 5.5 No Reference Range Established PH    POC Protein, Urine 30 (1+) (A) NEGATIVE mg/dl    POC Urobilinogen, Urine 0.2 0.2, 1.0 EU/DL    Poc Nitrite, Urine NEGATIVE NEGATIVE    POC Leukocytes, Urine SMALL (1+) (A) NEGATIVE       Assessment/Plan   Problem List Items Addressed This Visit    None  Visit Diagnoses         Codes      Multigravida of advanced maternal age in second trimester ([facility])    -  Primary O09.522    Relevant Orders    POCT UA Automated manually resulted (Completed)      18 weeks gestation of pregnancy ([facility])     Z3A.18      Other idiopathic scoliosis, lumbosacral region     M41.27      Back pain affecting pregnancy     O99.891, M54.9    Relevant Orders    Referral to Physical Therapy          Continue prenatal vitamin.  Labs reviewed.  Schedule anatomy ultrasound.  Follow up in 4 weeks  for a routine prenatal visit.

## 2025-04-23 ENCOUNTER — APPOINTMENT (OUTPATIENT)
Dept: RADIOLOGY | Facility: CLINIC | Age: 35
End: 2025-04-23
Payer: COMMERCIAL

## 2025-04-29 ENCOUNTER — TELEPHONE (OUTPATIENT)
Dept: OBSTETRICS AND GYNECOLOGY | Facility: CLINIC | Age: 35
End: 2025-04-29

## 2025-04-29 ENCOUNTER — INITIAL PRENATAL (OUTPATIENT)
Dept: MATERNAL FETAL MEDICINE | Facility: CLINIC | Age: 35
End: 2025-04-29
Payer: COMMERCIAL

## 2025-04-29 ENCOUNTER — HOSPITAL ENCOUNTER (OUTPATIENT)
Dept: RADIOLOGY | Facility: CLINIC | Age: 35
Discharge: HOME | End: 2025-04-29
Payer: COMMERCIAL

## 2025-04-29 VITALS — DIASTOLIC BLOOD PRESSURE: 76 MMHG | SYSTOLIC BLOOD PRESSURE: 124 MMHG

## 2025-04-29 DIAGNOSIS — Z34.90 PRENATAL CARE, ANTEPARTUM: ICD-10-CM

## 2025-04-29 DIAGNOSIS — O36.4XX0 IUFD AT 20 WEEKS OR MORE OF GESTATION: Primary | ICD-10-CM

## 2025-04-29 PROCEDURE — 99213 OFFICE O/P EST LOW 20 MIN: CPT | Performed by: OBSTETRICS & GYNECOLOGY

## 2025-04-29 PROCEDURE — 76815 OB US LIMITED FETUS(S): CPT

## 2025-04-29 NOTE — TELEPHONE ENCOUNTER
Rec'd call from [facility], pt noted to have fetal demise on anatomy scan; they briefly dw pt options but pt shocked and unable to process. Called pt and doesn't think she wants to go through w/induction, opting for D&E; [facility]/Kristy Batista notified and will contact pt. Pt may need paperwork to write her off work after, ok to do up to 6wks if needed. Pt will need followup in office approx 2 weeks after procedure to check in; will await info of timing before scheduling

## 2025-04-29 NOTE — PROGRESS NOTES
ADD-ON CONSULT FOR ULTRASOUND FINDINGS    Patient found to have a fetal demise on today's ultrasound.    History reviewed: Michelle Higgins is a 33yo  @ 20w3d who presented today for an anatomic survey. Pregnancy to this point uncomplicated. She has a prior full term vaginal delivery of a nearly 4kg infant. She has a history of scoliosis and is an alpha thalassemia carrier. Testing for the FOB is pending.     Labs reviewed: She has risk reducing cffDNA ([facility]) and moderate anemia Hgb 9.5).     We reviewed the findings of todays ultrasound in detail including the hydropic (fluid in abnormal places including skin, lungs, abdomen) nature of the fetus and the associated risk with maternal complications.     Visit Vitals  /76   LMP 2024   OB Status Pregnant   Smoking Status Never      Assessment & Plan  IUFD at 20 weeks or more of gestation  - Reviewed potential etiologies of fetal demise in detail including: unexplained, fetal conditions (genetic and structural), maternal conditions (isoimmunization, diabetes, HTN, etc), trauma, infection, clotting disorders (APLS, etc), and placental issues. Reviewed that the vast majority of fetal demises are unexplained and she did nothing to make this happen. Brief review of her history does not suggest a maternal etiology for fetal demise.   - Recommended genetic analysis of the products of conception with microarray minimum, preferred WGS and maternal lab analysis including TORCH titers, APLS labs, TSH, A1c.   - Reviewed potential management options at this time including IOL and D&E. Will defer this decision to her primary OB.   - Notified her primary OB office (Dr. Hill at [place]). Spoke via phone.  - MFM preconception consultation recommended after all above studies have resulted.   - Condolences given. Referred to [facility] for bereavement services.      This consult was specifically to address ultrasound findings alone. Other maternal medical conditions  were not reviewed.    Shawna Valenzuela MD  Maternal Fetal Medicine  Director of Fetal Intervention

## 2025-04-30 ENCOUNTER — TELEMEDICINE CLINICAL SUPPORT (OUTPATIENT)
Dept: GENETICS | Facility: CLINIC | Age: 35
End: 2025-04-30
Payer: COMMERCIAL

## 2025-04-30 ENCOUNTER — PREP FOR PROCEDURE (OUTPATIENT)
Dept: OBSTETRICS AND GYNECOLOGY | Facility: HOSPITAL | Age: 35
End: 2025-04-30
Payer: COMMERCIAL

## 2025-04-30 DIAGNOSIS — O02.1 MISSED ABORTION (HHS-HCC): Primary | ICD-10-CM

## 2025-04-30 DIAGNOSIS — O02.1 MISSED ABORTION (HHS-HCC): ICD-10-CM

## 2025-04-30 DIAGNOSIS — O02.1 FETAL DEMISE BEFORE 20 WEEKS WITH RETENTION OF DEAD FETUS (HHS-HCC): ICD-10-CM

## 2025-04-30 PROCEDURE — GENMD PR GENETICS VISIT (MEDICAID/MEDICARE): Performed by: GENETIC COUNSELOR, MS

## 2025-05-01 ENCOUNTER — ROUTINE PRENATAL (OUTPATIENT)
Dept: MATERNAL FETAL MEDICINE | Facility: HOSPITAL | Age: 35
End: 2025-05-01
Payer: COMMERCIAL

## 2025-05-01 ENCOUNTER — ANESTHESIA EVENT (OUTPATIENT)
Dept: OBSTETRICS AND GYNECOLOGY | Facility: HOSPITAL | Age: 35
End: 2025-05-01
Payer: COMMERCIAL

## 2025-05-01 ENCOUNTER — LAB REQUISITION (OUTPATIENT)
Dept: LAB | Facility: HOSPITAL | Age: 35
End: 2025-05-01

## 2025-05-01 ENCOUNTER — LAB (OUTPATIENT)
Dept: LAB | Facility: HOSPITAL | Age: 35
End: 2025-05-01
Payer: COMMERCIAL

## 2025-05-01 VITALS — DIASTOLIC BLOOD PRESSURE: 70 MMHG | BODY MASS INDEX: 21.92 KG/M2 | SYSTOLIC BLOOD PRESSURE: 115 MMHG | WEIGHT: 140 LBS

## 2025-05-01 DIAGNOSIS — O36.4XX0 IUFD AT 20 WEEKS OR MORE OF GESTATION: Primary | ICD-10-CM

## 2025-05-01 DIAGNOSIS — O02.1 MISSED ABORTION (HHS-HCC): Primary | ICD-10-CM

## 2025-05-01 DIAGNOSIS — O02.1 MISSED ABORTION (HHS-HCC): ICD-10-CM

## 2025-05-01 LAB
ABO GROUP (TYPE) IN BLOOD: NORMAL
ANTIBODY SCREEN: NORMAL
ERYTHROCYTE [DISTWIDTH] IN BLOOD BY AUTOMATED COUNT: 14.5 % (ref 11.5–14.5)
HCT VFR BLD AUTO: 29.4 % (ref 36–46)
HGB BLD-MCNC: 9 G/DL (ref 12–16)
MCH RBC QN AUTO: 27.9 PG (ref 26–34)
MCHC RBC AUTO-ENTMCNC: 30.6 G/DL (ref 32–36)
MCV RBC AUTO: 91 FL (ref 80–100)
NRBC BLD-RTO: 0 /100 WBCS (ref 0–0)
PLATELET # BLD AUTO: 271 X10*3/UL (ref 150–450)
RBC # BLD AUTO: 3.23 X10*6/UL (ref 4–5.2)
RH FACTOR (ANTIGEN D): NORMAL
WBC # BLD AUTO: 6.8 X10*3/UL (ref 4.4–11.3)

## 2025-05-01 PROCEDURE — 36415 COLL VENOUS BLD VENIPUNCTURE: CPT

## 2025-05-01 PROCEDURE — 86901 BLOOD TYPING SEROLOGIC RH(D): CPT

## 2025-05-01 PROCEDURE — 86900 BLOOD TYPING SEROLOGIC ABO: CPT

## 2025-05-01 PROCEDURE — 86850 RBC ANTIBODY SCREEN: CPT

## 2025-05-01 PROCEDURE — 85027 COMPLETE CBC AUTOMATED: CPT

## 2025-05-01 RX ORDER — LIDOCAINE HYDROCHLORIDE 10 MG/ML
20 INJECTION, SOLUTION INFILTRATION; PERINEURAL ONCE
Status: SHIPPED | OUTPATIENT
Start: 2025-05-01

## 2025-05-01 NOTE — TELEPHONE ENCOUNTER
FYI- appears procedure has been scheduled for tomorrow. Should FMLA ppw be requested/sent, can be directed to me.   Msg to clinical staff to schedule follow up visit.

## 2025-05-01 NOTE — PROGRESS NOTES
Insertion of Cervical Dilators    Date/Time: 2025 1:14 PM    Performed by: Ute Walls MD & May Baltazar MD    Prior to the procedure the patient was consented for laminaria placement and dilation and evacuation procedure.  I reviewed the risks of infection, bleeding and  labor after placement.    Universal Protocol:     Patient states understanding of procedure being performed: yes      Relevant documents present and verified: yes      Test results available and properly labeled: yes      Imaging studies available: yes      Required blood products, implants, devices, and special equipment available: yes      Site marked: n/a.    Pre-procedure:     Pre-procedure timeout performed: yes      Premeds:  Ibuprofen    Prepped with: povidone-iodine      Local anesthetic:  Lidocaine 1%    Procedure:    Sterile speculum exam was performed with normal appearing cervix and vaginal mucosa.  With adequate visualization the cervix was swabbed x3 with betadine.  2 mL of 1% lidocaine was injected into the anterior aspect of the cervix.  The anterior aspect was grasped with a single tooth tenaculum.  A paracervical block was then performed by injecting 4 mL of 1% lidocaine on each side at 4 o'clock and 8 o'clock.  Eight size #4 (medium) laminaria were inserted into the endocervical canal with a ring forceps.  A single betadine-soaked gauze was inserted to hold the dilators in place.  The patient tolerated the procedure well.  Bleeding was minimal.    Precautions were reviewed and the office's contact number was provided following the procedure today. She was sent to the outpatient lab to collect blood work before tomorrow's procedure.     Dr. Baltazar was present for the procedure.     Ute Walls MD, PGY 5  Maternal Fetal Medicine     I was present for this uncomplicated laminaria insertion with 8 laminaria and 1 piece of gauze placed in situ. Precautions were reviewed with the patient.     May Baltazar MD   [facility]

## 2025-05-01 NOTE — PROGRESS NOTES
"Michelle Higgins is a 34 y.o. old, , female who was seen for genetic counseling. She was seen with her partner, RJ ([place]) to discuss her genetic screening and testing options due to an IUFD identified at approximately 20 weeks and 3 days.      PAST HISTORY:  The patient has a personal history of scoliosis and ADHD.  The patient had cell-free DNA screening sent to [facility] for Trisomy 21, 18, 13, and select microdeletions which came back risk reducing.  The patient had a [facility] 14 condition carrier screen panel and was found to be a silent carrier of alpha thalassemia trait (-a/aa).  The remainder of her carrier screening was risk reducing.  Her partner LANE then had a 14 condition carrier screening panel sent to [facility] ordered by the patient's primary OBGYN (which included alpha thalassemia trait).  This screen is still pending.      Ultrasound 25, per report:  \"- A fetal demise is noted with no cardiac activity on color flow or M-mode  - Biometry measures < 1 %ile with a nearly 3 week lag in growth  - The fetus is globally hydropic with skin edema, ascites, and a pleural effusion  - Amniotic fluid volume appears normal...\"    Ultrasound 3/4/25, per report:  \"- Joshi fetus with cardiac activity is seen  - Crown rump length is consistent with established JYOTI  - Fetal organ survey is within normal limits for the gestational age  - Nuchal translucency is within normal limits measuring 1.3 mm  - Placenta & adnexa within normal limits...\"    FAMILY HISTORY:  Medical and family histories were reviewed and the following concerns regarding this pregnancy were apparent:      Ms. Higgins:  Personal history- as noted above  Mother- had a stillbirth     Her partner, RJ:  Father- prostate cancer, diagnosed in his late 40's, Parkinson's disease, diagnosed in his 60's  Paternal aunt- lung cancer, diagnosed in her 50's,  in her 50's    The remainder of the family history was negative for birth defects, " intellectual disability, recurrent pregnancy loss, or recognized inherited conditions.  Consanguinity was denied.  The Pedigree is scanned in the Media tab and is available for a full review of the family history.      ANCESTRY:   The patient reported that she is of  ancestry  Her partner reported that he is of  and  ancestry.  Ashkenazi Anabaptist ancestry was denied.    The patient had a 14 condition carrier screening panel and had negative carrier screening for CF, SMA and HBB.  The patient was found to be a silent carrier of alpha thalassemia trait (-a/aa) (see counseling below)  We also discussed the availability of more expanded/pan ethnic carrier screening for additional, primarily autosomal recessive, conditions. We discussed the pros and cons of expanded carrier screening including the higher likelihood of being identified as a carrier for at least one condition on a larger panel. Approximately 4% of couples are found to be at risk to have a child with a genetic disorder based on this screening. In rare cases, expanded carrier screening results may have health implications for the tested individual.      After careful consideration, the couple declined all carrier screening.      COUNSELING:  The following information was discussed with your patient:    1. The ultrasound study finding of hydrops was reviewed. This is an excessive amount of fluid in two or more body areas in the baby. Hydrops can be isolated (occurring alone) or can be associated with chromosomal abnormalities, genetic syndromes, structural abnormalities in the fetus (including heart defects), abnormal lymphatic system, anemia or Rh incompatibility. The underlying etiology of the hydrops may be environmental, genetic, or unknown. Environmental factors associated hydrops include certain viral infections and maternal alcohol use.   Fetal hydrops can also develop post-death as a secondary feature and  is often associated with degradation of fetal tissues.     Hydrops is associated with chromosomal abnormalities, including common aneuploidies. In addition to large chromosomal abnormalities clinically significant microdeletions and micro-duplications (found on microarray) could be associated with hydrops. Certain single gene disorders, including [place] syndrome, have been reported in cases of hydrops. Hydrops may be due to any underlying autosomal recessive disorder or other genetic disorders with various inheritance patterns.    2. The benefits and limitations of the standard cell-free DNA screening with select microdeletions that the patient had.  We discussed the methodology for this screen, which includes using cell-free DNA obtained from a mother's blood (derived from the placenta) to screen for the presence of common chromosomal abnormalities. Depending on the laboratory used, there is a >99% sensitivity for Down syndrome, at least 97.4% sensitivity for trisomy 18, and at least 91% sensitivity for trisomy 13.  Specificity for these trisomies is >99%. Although sensitivity and specificity rates are high, in our experience the positive predictive value is dependent on many factors; whereas false negative results are rare.  We also reviewed the performance of microdeletions.  In addition, anticoagulants, maternal chromosome abnormality, fibroids or malignancy may impact results and/or be associated with inconclusive or other abnormal findings.  This is not a diagnostic test.        3. The methods, benefits, limitations and risks of amniocentesis.    4. The methods, benefits, limitations and risks of genetic testing on a products of conception sample.    We discussed the option of a microarray and whole genome sequencing on this diagnostic testing option.      5. We discussed the benefits and limitations of the whole genome sequencing (WGS) test, which is a comprehensive method for analyzing entire genomes. WGS is  "comprehensive sequence analysis (including single nucleotide variants, deletions/insertions, intronic, and regulatory variants, structural and copy number variant analysis, and short tandem repeat expansion analysis for select conditions).    In our clinical experience, WGS has an approximate 25-30% overall success rate in providing a diagnosis. Some of the reasons that this number is not higher may include that certain types of gene changes may not be detectable by this test and some of the genes that can cause particular symptoms may not have been identified yet (are not well understood). In addition, the test is not designed to diagnose disorders caused by changes in multiple genes (multigenic) or by genes and environmental factors together (multifactorial).     DNA samples from parents are requested when performing a whole genome sequencing test on the fetal DNA. Parent DNA is used to help interpret the fetal results.  The patient and her partner elected to know about variants of unknown significance, or uncertain findings. We discussed that with time, the meaning of many of these uncertain findings becomes clearer.     The patient and her partner elected to receive information about the genes on the medically-actionable \"incidental findings\" list provided by the American [employer] and Genomics for the pregnancy. They understand that a normal result for these genes is not a guarantee that there is no increased genetic risk for these diseases.     We discussed the Genetic Information Nondiscrimination Act (CASSIUS). We discussed the protections and limitations of CASSIUS. CASSIUS generally makes in illegal for health insurance companies, group health plans, and most employers (with >15 employees) to discriminate based on genetic information. It does not protect against genetic discrimination for life insurance, disability insurance, or long-term care, or mortgage insurance.     [facility] " quotes an approximate 15 day turn around time for results of the test. It is possible that the test will take longer than this due to various factors at the laboratory.    A positive genetic test result will provide an underlying diagnosis that will in turn give additional information regarding the disorder as well as future health issues to anticipate. Recommendations for the treatment/prevention will be made on the basis of the test results and may include specialist evaluations, imaging studies, developmental therapies, as well as others. Additionally, a positive genetic test result will provide information on recurrence risk for the couple and other family members to have a child with the same condition.   If a genetic answer for the fetuses' features is found, there may be the option of PGT-M performed on embryos prior to implantation or  diagnostic testing in a future pregnancy.     The patient received genetic counseling regarding the benefits, risks, and limitations of WGS on the products of conception and has elected to proceed.    6. The patient is a silent carrier of alpha-thalassemia(-a/aa).    We discussed the inheritance of alpha thalassemia. There are four genes for alpha globin total (two from each parent).     --A person with a mutation or deletion of one of these genes often has no symptoms (called a silent carrier, -a/aa). *This is the status of the patient.  -- A person with a mutation or deletion in 2 copies has alpha-thalassemia trait(alpha thalassemia minor) which causes mild microcytic anemia. These mutations can be on the same chromosome (--/aa; more common in people who are Southeast , Irish, or Mediterranean) or opposite chromosomes (a-/a-; more common in people who are ).   -- A person with mutations or deletions in 3 copies are associated with Hemoglobin H disease (a-/--) which causes more severe clinical symptoms including severe microcytic anemia, splenomegaly,  and sometimes bone changes.   -- Mutations or deletions in all 4 copies is usually fatal and causes prenatal onset of hydrops (--/--).     The patient's offspring would most likely not be at increased risk for deletions in all 4 copies, as each of the patient's chromosomes has at least 1 working copy of the alpha globin gene. Detection rates via [employer] for this condition in the high risk population is approximately 99%.  1 in 30 individuals of  ancestry are carriers of alpha thalassemia trait, but in those of this ethnic background, in the event of 2 alpha globin gene deletions, these deletions are typically found on opposite chromosomes (in trans configuration a-/a-). In other words, one deletion is inherited from each parent. For the pregnancy to be at risk for hemoglobin H disease (a-/--), the child would need to inherit 2 deletions or mutations in cis from the father. Because the patient's partner is also , he is unlikely to have 2 deletions in cis (--/aa). The cis configuration is more common in patients who are Southeast , [place] and Mediterranean ancestry.  The patient's partner currently had carrier screening for alpha thalassemia pending.      We recommend that the couple share this information with all of their children when of reproductive age but prior to reproducing and family members who may be considering having children in the future, as they may wish to pursue carrier testing themselves based on these results.  The patient's children are at least at a 50% risk to be silent carriers of alpha-thalassemia.    7. Additional Family History Information:   The patient has a personal history of scoliosis and ADHD.  Often, these conditions are due to a combination of genetic and environmental factors (which often remain unknown).  The risk to have them often depends on the amount and degree of affected family members.  It also depends on if an underlying genetic cause of these  "conditions can be identified.   With this history, the offspring are at an increased risk for the disorders in the family and this information should be shared with all relevant primary care providers.      The patient's mother had a stillbirth.  The patient did not know if any genetic testing was performed.  A stillbirth may occur as part of a chromosome abnormality or single gene disorder.  There are many other reasons for stillbirth.  Sometimes the cause of a stillbirth remains unknown. Without further information, we cannot provide a risk for the patient's offspring based on this history.    The patient's partner reported that his father was diagnosed with prostate cancer in his 40's.  Cancer genetic counseling is recommended for his father.  An appointment with our Cancer Genetics Clinic can be made by calling [phone].  At that time, an assessment of the history of cancer, cancer genetic testing, personal cancer risk and options for risk reduction would then be discussed.   If these individuals are unable or unwilling to have cancer genetic counseling, other family members, including the patient's partner, may seek cancer genetic counseling based on this family history.    The patient's partner's father was diagnosed with Parkinson's disease (PD) in his 60's.  Most cases of PD are sporadic and are probably due to complex interaction of environmental, unknown, and genetic factors. \"Approximately 15 percent of people with Parkinson disease have a family history of this disorder. Familial cases of Parkinson disease can be caused by mutations in the LRRK2, PARK7, PINK1, PRKN, or SNCA gene, or by alterations in genes that have not been identified. Mutations in some of these genes may also play a role in cases that appear to be sporadic (not inherited). (Genetics Home Reference 2020). There are other genetic risk factors for PD.  If the affected individual or other family members wish to explore these further, " they may make a general genetic counseling appointment by calling [phone].  If an underlying genetic etiology in the family is determined, precise recurrence risks could be provided, and testing for other family members may be available if clinically indicated.    DISPOSITION:  The patient stated that she understood the above information and elected to proceed with whole genome sequencing to [facility] and a DNA extract and store on the products of conception from the patient's D&E procedure scheduled on 5/2/25.  The patient and RJ both also consented to submitting parental blood samples for whole genome sequencing comparison.  Genetic amniocentesis was declined.  All carrier screening was declined.     Thank you for allowing us to participate in the care of your patient.  Should you or your patient have any questions, please do not hesitate to contact our office at [phone].    Licensed Genetic Counselor Bhargavi Simons MS, [facility] spent 56 total minutes on the day of the encounter for patient care (36 minutes with patient on video telehealth, 20 minutes on pre/post patient care activities, including documentation).    Sincerely,    Bhargavi Simons MS  Licensed Genetic Counselor    Reviewed by:  Dr. Ranulfo Ibarra MD  Maternal Fetal Medicine Specialist

## 2025-05-02 ENCOUNTER — HOSPITAL ENCOUNTER (INPATIENT)
Facility: HOSPITAL | Age: 35
LOS: 1 days | Discharge: HOME | End: 2025-05-02
Attending: STUDENT IN AN ORGANIZED HEALTH CARE EDUCATION/TRAINING PROGRAM | Admitting: STUDENT IN AN ORGANIZED HEALTH CARE EDUCATION/TRAINING PROGRAM
Payer: COMMERCIAL

## 2025-05-02 ENCOUNTER — PHARMACY VISIT (OUTPATIENT)
Dept: PHARMACY | Facility: CLINIC | Age: 35
End: 2025-05-02
Payer: MEDICAID

## 2025-05-02 ENCOUNTER — ANESTHESIA (OUTPATIENT)
Dept: OBSTETRICS AND GYNECOLOGY | Facility: HOSPITAL | Age: 35
End: 2025-05-02
Payer: COMMERCIAL

## 2025-05-02 ENCOUNTER — DOCUMENTATION (OUTPATIENT)
Dept: CASE MANAGEMENT | Facility: HOSPITAL | Age: 35
End: 2025-05-02

## 2025-05-02 VITALS
BODY MASS INDEX: 22.46 KG/M2 | TEMPERATURE: 98.4 F | OXYGEN SATURATION: 100 % | DIASTOLIC BLOOD PRESSURE: 60 MMHG | HEART RATE: 100 BPM | RESPIRATION RATE: 16 BRPM | HEIGHT: 67 IN | SYSTOLIC BLOOD PRESSURE: 130 MMHG | WEIGHT: 143.08 LBS

## 2025-05-02 DIAGNOSIS — D64.9 ANEMIA, UNSPECIFIED TYPE: ICD-10-CM

## 2025-05-02 DIAGNOSIS — G89.18 POST-OP PAIN: Primary | ICD-10-CM

## 2025-05-02 PROBLEM — M41.9 SCOLIOSIS: Status: ACTIVE | Noted: 2025-05-02

## 2025-05-02 LAB
ABO GROUP (TYPE) IN BLOOD: NORMAL
B2 GLYCOPROT1 IGA SER-ACNC: 1 U/ML
B2 GLYCOPROT1 IGG SER-ACNC: <1.4 U/ML
B2 GLYCOPROT1 IGM SER-ACNC: 0.6 U/ML
CARDIOLIPIN IGA SERPL-ACNC: 1.1 APL U/ML
CARDIOLIPIN IGG SER IA-ACNC: <1.6 GPL U/ML
CARDIOLIPIN IGM SER IA-ACNC: <0.2 MPL U/ML
FETAL RBC/RBC BLD FC-RTO: 0.02 %
RH FACTOR (ANTIGEN D): NORMAL

## 2025-05-02 PROCEDURE — 88305 TISSUE EXAM BY PATHOLOGIST: CPT | Mod: TC,SUR

## 2025-05-02 PROCEDURE — 86146 BETA-2 GLYCOPROTEIN ANTIBODY: CPT | Performed by: STUDENT IN AN ORGANIZED HEALTH CARE EDUCATION/TRAINING PROGRAM

## 2025-05-02 PROCEDURE — RXMED WILLOW AMBULATORY MEDICATION CHARGE

## 2025-05-02 PROCEDURE — 86147 CARDIOLIPIN ANTIBODY EA IG: CPT | Performed by: STUDENT IN AN ORGANIZED HEALTH CARE EDUCATION/TRAINING PROGRAM

## 2025-05-02 PROCEDURE — 2500000001 HC RX 250 WO HCPCS SELF ADMINISTERED DRUGS (ALT 637 FOR MEDICARE OP): Mod: SE

## 2025-05-02 PROCEDURE — 2500000004 HC RX 250 GENERAL PHARMACY W/ HCPCS (ALT 636 FOR OP/ED): Mod: JZ,SE | Performed by: STUDENT IN AN ORGANIZED HEALTH CARE EDUCATION/TRAINING PROGRAM

## 2025-05-02 PROCEDURE — 36415 COLL VENOUS BLD VENIPUNCTURE: CPT

## 2025-05-02 PROCEDURE — 88184 FLOWCYTOMETRY/ TC 1 MARKER: CPT | Performed by: STUDENT IN AN ORGANIZED HEALTH CARE EDUCATION/TRAINING PROGRAM

## 2025-05-02 PROCEDURE — G0378 HOSPITAL OBSERVATION PER HR: HCPCS

## 2025-05-02 PROCEDURE — 2500000004 HC RX 250 GENERAL PHARMACY W/ HCPCS (ALT 636 FOR OP/ED): Mod: JZ,SE | Performed by: NURSE ANESTHETIST, CERTIFIED REGISTERED

## 2025-05-02 PROCEDURE — 36415 COLL VENOUS BLD VENIPUNCTURE: CPT | Performed by: STUDENT IN AN ORGANIZED HEALTH CARE EDUCATION/TRAINING PROGRAM

## 2025-05-02 PROCEDURE — 2500000004 HC RX 250 GENERAL PHARMACY W/ HCPCS (ALT 636 FOR OP/ED): Mod: SE

## 2025-05-02 PROCEDURE — 7100000016 HC LABOR RECOVERY PER HOUR: Performed by: STUDENT IN AN ORGANIZED HEALTH CARE EDUCATION/TRAINING PROGRAM

## 2025-05-02 PROCEDURE — 3700000014 HC AN EPIDURAL BLOCK CHARGE: Performed by: STUDENT IN AN ORGANIZED HEALTH CARE EDUCATION/TRAINING PROGRAM

## 2025-05-02 PROCEDURE — 86923 COMPATIBILITY TEST ELECTRIC: CPT

## 2025-05-02 PROCEDURE — 85613 RUSSELL VIPER VENOM DILUTED: CPT | Performed by: STUDENT IN AN ORGANIZED HEALTH CARE EDUCATION/TRAINING PROGRAM

## 2025-05-02 PROCEDURE — 1120000001 HC OB PRIVATE ROOM DAILY

## 2025-05-02 PROCEDURE — 59841 INDUCED ABORTION DILAT&EVAC: CPT | Performed by: STUDENT IN AN ORGANIZED HEALTH CARE EDUCATION/TRAINING PROGRAM

## 2025-05-02 RX ORDER — FENTANYL CITRATE 50 UG/ML
INJECTION, SOLUTION INTRAMUSCULAR; INTRAVENOUS AS NEEDED
Status: DISCONTINUED | OUTPATIENT
Start: 2025-05-02 | End: 2025-05-02

## 2025-05-02 RX ORDER — HYDROMORPHONE HYDROCHLORIDE 1 MG/ML
0.6 INJECTION, SOLUTION INTRAMUSCULAR; INTRAVENOUS; SUBCUTANEOUS
Status: DISCONTINUED | OUTPATIENT
Start: 2025-05-02 | End: 2025-05-02 | Stop reason: HOSPADM

## 2025-05-02 RX ORDER — OXYTOCIN/0.9 % SODIUM CHLORIDE 30/500 ML
60 PLASTIC BAG, INJECTION (ML) INTRAVENOUS ONCE AS NEEDED
Status: DISCONTINUED | OUTPATIENT
Start: 2025-05-02 | End: 2025-05-02 | Stop reason: HOSPADM

## 2025-05-02 RX ORDER — KETOROLAC TROMETHAMINE 30 MG/ML
INJECTION, SOLUTION INTRAMUSCULAR; INTRAVENOUS AS NEEDED
Status: DISCONTINUED | OUTPATIENT
Start: 2025-05-02 | End: 2025-05-02

## 2025-05-02 RX ORDER — ONDANSETRON 4 MG/1
4 TABLET, FILM COATED ORAL EVERY 6 HOURS PRN
Status: DISCONTINUED | OUTPATIENT
Start: 2025-05-02 | End: 2025-05-02 | Stop reason: HOSPADM

## 2025-05-02 RX ORDER — SUCCINYLCHOLINE CHLORIDE 20 MG/ML
INJECTION INTRAMUSCULAR; INTRAVENOUS AS NEEDED
Status: DISCONTINUED | OUTPATIENT
Start: 2025-05-02 | End: 2025-05-02

## 2025-05-02 RX ORDER — ONDANSETRON HYDROCHLORIDE 2 MG/ML
4 INJECTION, SOLUTION INTRAVENOUS EVERY 6 HOURS PRN
Status: DISCONTINUED | OUTPATIENT
Start: 2025-05-02 | End: 2025-05-02 | Stop reason: HOSPADM

## 2025-05-02 RX ORDER — LABETALOL HYDROCHLORIDE 5 MG/ML
20 INJECTION, SOLUTION INTRAVENOUS ONCE AS NEEDED
Status: DISCONTINUED | OUTPATIENT
Start: 2025-05-02 | End: 2025-05-02 | Stop reason: HOSPADM

## 2025-05-02 RX ORDER — HYDROMORPHONE HYDROCHLORIDE 2 MG/1
1 TABLET ORAL EVERY 4 HOURS PRN
Qty: 10 TABLET | Refills: 0 | Status: SHIPPED | OUTPATIENT
Start: 2025-05-02 | End: 2025-05-09

## 2025-05-02 RX ORDER — OXYTOCIN/0.9 % SODIUM CHLORIDE 30/500 ML
PLASTIC BAG, INJECTION (ML) INTRAVENOUS CONTINUOUS PRN
Status: DISCONTINUED | OUTPATIENT
Start: 2025-05-02 | End: 2025-05-02

## 2025-05-02 RX ORDER — ACETAMINOPHEN 325 MG/1
650 TABLET ORAL EVERY 6 HOURS
Qty: 56 TABLET | Refills: 0 | Status: SHIPPED | OUTPATIENT
Start: 2025-05-02 | End: 2025-05-09

## 2025-05-02 RX ORDER — PROPOFOL 10 MG/ML
INJECTION, EMULSION INTRAVENOUS AS NEEDED
Status: DISCONTINUED | OUTPATIENT
Start: 2025-05-02 | End: 2025-05-02

## 2025-05-02 RX ORDER — CYCLOBENZAPRINE HCL 10 MG
5 TABLET ORAL 3 TIMES DAILY
Qty: 10 TABLET | Refills: 0 | Status: SHIPPED | OUTPATIENT
Start: 2025-05-02

## 2025-05-02 RX ORDER — METOCLOPRAMIDE HYDROCHLORIDE 5 MG/ML
10 INJECTION INTRAMUSCULAR; INTRAVENOUS ONCE
Status: DISCONTINUED | OUTPATIENT
Start: 2025-05-02 | End: 2025-05-02 | Stop reason: HOSPADM

## 2025-05-02 RX ORDER — CYCLOBENZAPRINE HCL 10 MG
10 TABLET ORAL ONCE
Status: COMPLETED | OUTPATIENT
Start: 2025-05-02 | End: 2025-05-02

## 2025-05-02 RX ORDER — MIDAZOLAM HYDROCHLORIDE 1 MG/ML
INJECTION INTRAMUSCULAR; INTRAVENOUS AS NEEDED
Status: DISCONTINUED | OUTPATIENT
Start: 2025-05-02 | End: 2025-05-02

## 2025-05-02 RX ORDER — TRAMADOL HYDROCHLORIDE 50 MG/1
50 TABLET ORAL ONCE
Status: COMPLETED | OUTPATIENT
Start: 2025-05-02 | End: 2025-05-02

## 2025-05-02 RX ORDER — LOPERAMIDE HYDROCHLORIDE 2 MG/1
4 CAPSULE ORAL EVERY 2 HOUR PRN
Status: DISCONTINUED | OUTPATIENT
Start: 2025-05-02 | End: 2025-05-02 | Stop reason: HOSPADM

## 2025-05-02 RX ORDER — TRAMADOL HYDROCHLORIDE 50 MG/1
50 TABLET ORAL EVERY 6 HOURS PRN
Qty: 12 TABLET | Refills: 0 | Status: SHIPPED | OUTPATIENT
Start: 2025-05-02 | End: 2025-05-06

## 2025-05-02 RX ORDER — DOXYCYCLINE 100 MG/10ML
INJECTION, POWDER, LYOPHILIZED, FOR SOLUTION INTRAVENOUS AS NEEDED
Status: DISCONTINUED | OUTPATIENT
Start: 2025-05-02 | End: 2025-05-02

## 2025-05-02 RX ORDER — OXYTOCIN 10 [USP'U]/ML
10 INJECTION, SOLUTION INTRAMUSCULAR; INTRAVENOUS ONCE AS NEEDED
Status: DISCONTINUED | OUTPATIENT
Start: 2025-05-02 | End: 2025-05-02 | Stop reason: HOSPADM

## 2025-05-02 RX ORDER — ONDANSETRON 4 MG/1
4 TABLET, ORALLY DISINTEGRATING ORAL EVERY 8 HOURS PRN
Qty: 20 TABLET | Refills: 0 | Status: SHIPPED | OUTPATIENT
Start: 2025-05-02 | End: 2025-05-16

## 2025-05-02 RX ORDER — TRANEXAMIC ACID 100 MG/ML
1000 INJECTION, SOLUTION INTRAVENOUS ONCE AS NEEDED
Status: DISCONTINUED | OUTPATIENT
Start: 2025-05-02 | End: 2025-05-02 | Stop reason: HOSPADM

## 2025-05-02 RX ORDER — LIDOCAINE HYDROCHLORIDE 10 MG/ML
30 INJECTION, SOLUTION INFILTRATION; PERINEURAL ONCE AS NEEDED
Status: DISCONTINUED | OUTPATIENT
Start: 2025-05-02 | End: 2025-05-02 | Stop reason: HOSPADM

## 2025-05-02 RX ORDER — IBUPROFEN 600 MG/1
600 TABLET ORAL EVERY 6 HOURS PRN
Qty: 56 TABLET | Refills: 0 | Status: SHIPPED | OUTPATIENT
Start: 2025-05-02

## 2025-05-02 RX ORDER — MISOPROSTOL 200 UG/1
800 TABLET ORAL ONCE AS NEEDED
Status: DISCONTINUED | OUTPATIENT
Start: 2025-05-02 | End: 2025-05-02 | Stop reason: HOSPADM

## 2025-05-02 RX ORDER — ACETAMINOPHEN 325 MG/1
975 TABLET ORAL ONCE
Status: COMPLETED | OUTPATIENT
Start: 2025-05-02 | End: 2025-05-02

## 2025-05-02 RX ORDER — CARBOPROST TROMETHAMINE 250 UG/ML
250 INJECTION, SOLUTION INTRAMUSCULAR ONCE AS NEEDED
Status: DISCONTINUED | OUTPATIENT
Start: 2025-05-02 | End: 2025-05-02 | Stop reason: HOSPADM

## 2025-05-02 RX ORDER — PHENYLEPHRINE HYDROCHLORIDE 10 MG/ML
INJECTION INTRAVENOUS AS NEEDED
Status: DISCONTINUED | OUTPATIENT
Start: 2025-05-02 | End: 2025-05-02

## 2025-05-02 RX ORDER — TERBUTALINE SULFATE 1 MG/ML
0.25 INJECTION SUBCUTANEOUS ONCE AS NEEDED
Status: DISCONTINUED | OUTPATIENT
Start: 2025-05-02 | End: 2025-05-02 | Stop reason: HOSPADM

## 2025-05-02 RX ORDER — HYDRALAZINE HYDROCHLORIDE 20 MG/ML
5 INJECTION INTRAMUSCULAR; INTRAVENOUS ONCE AS NEEDED
Status: DISCONTINUED | OUTPATIENT
Start: 2025-05-02 | End: 2025-05-02 | Stop reason: HOSPADM

## 2025-05-02 RX ORDER — ROCURONIUM BROMIDE 10 MG/ML
INJECTION, SOLUTION INTRAVENOUS AS NEEDED
Status: DISCONTINUED | OUTPATIENT
Start: 2025-05-02 | End: 2025-05-02

## 2025-05-02 RX ORDER — LIDOCAINE HYDROCHLORIDE 20 MG/ML
INJECTION, SOLUTION INFILTRATION; PERINEURAL AS NEEDED
Status: DISCONTINUED | OUTPATIENT
Start: 2025-05-02 | End: 2025-05-02

## 2025-05-02 RX ORDER — FERROUS SULFATE 325(65) MG
325 TABLET ORAL
Qty: 30 TABLET | Refills: 1 | Status: SHIPPED | OUTPATIENT
Start: 2025-05-02 | End: 2025-07-01

## 2025-05-02 RX ORDER — HYDROMORPHONE HYDROCHLORIDE 1 MG/ML
0.4 INJECTION, SOLUTION INTRAMUSCULAR; INTRAVENOUS; SUBCUTANEOUS ONCE
Status: COMPLETED | OUTPATIENT
Start: 2025-05-02 | End: 2025-05-02

## 2025-05-02 RX ORDER — METHYLERGONOVINE MALEATE 0.2 MG/ML
0.2 INJECTION INTRAVENOUS ONCE AS NEEDED
Status: DISCONTINUED | OUTPATIENT
Start: 2025-05-02 | End: 2025-05-02 | Stop reason: HOSPADM

## 2025-05-02 RX ORDER — SODIUM CHLORIDE, SODIUM LACTATE, POTASSIUM CHLORIDE, CALCIUM CHLORIDE 600; 310; 30; 20 MG/100ML; MG/100ML; MG/100ML; MG/100ML
75 INJECTION, SOLUTION INTRAVENOUS CONTINUOUS
Status: DISCONTINUED | OUTPATIENT
Start: 2025-05-02 | End: 2025-05-02 | Stop reason: HOSPADM

## 2025-05-02 RX ADMIN — HYDROMORPHONE HYDROCHLORIDE 0.6 MG: 1 INJECTION, SOLUTION INTRAMUSCULAR; INTRAVENOUS; SUBCUTANEOUS at 10:19

## 2025-05-02 RX ADMIN — ROCURONIUM BROMIDE 10 MG: 10 INJECTION INTRAVENOUS at 09:25

## 2025-05-02 RX ADMIN — PROPOFOL 160 MG: 10 INJECTION, EMULSION INTRAVENOUS at 08:56

## 2025-05-02 RX ADMIN — LIDOCAINE HYDROCHLORIDE 70 MG: 20 INJECTION, SOLUTION INFILTRATION; PERINEURAL at 08:56

## 2025-05-02 RX ADMIN — DOXYCYCLINE 200 MG: 100 INJECTION, POWDER, LYOPHILIZED, FOR SOLUTION INTRAVENOUS at 09:01

## 2025-05-02 RX ADMIN — CYCLOBENZAPRINE 10 MG: 10 TABLET, FILM COATED ORAL at 11:12

## 2025-05-02 RX ADMIN — SODIUM CHLORIDE, SODIUM LACTATE, POTASSIUM CHLORIDE, AND CALCIUM CHLORIDE: 600; 310; 30; 20 INJECTION, SOLUTION INTRAVENOUS at 08:46

## 2025-05-02 RX ADMIN — ONDANSETRON 4 MG: 2 INJECTION, SOLUTION INTRAMUSCULAR; INTRAVENOUS at 09:40

## 2025-05-02 RX ADMIN — MIDAZOLAM HYDROCHLORIDE 2 MG: 1 INJECTION, SOLUTION INTRAMUSCULAR; INTRAVENOUS at 08:48

## 2025-05-02 RX ADMIN — TRAMADOL HYDROCHLORIDE 50 MG: 50 TABLET, COATED ORAL at 11:28

## 2025-05-02 RX ADMIN — SUGAMMADEX 200 MG: 100 INJECTION, SOLUTION INTRAVENOUS at 09:43

## 2025-05-02 RX ADMIN — Medication 600 MILLI-UNITS/MIN: at 09:34

## 2025-05-02 RX ADMIN — FENTANYL CITRATE 50 MCG: 50 INJECTION, SOLUTION INTRAMUSCULAR; INTRAVENOUS at 09:25

## 2025-05-02 RX ADMIN — PHENYLEPHRINE HYDROCHLORIDE 80 MCG: 10 INJECTION INTRAVENOUS at 09:30

## 2025-05-02 RX ADMIN — HYDROMORPHONE HYDROCHLORIDE 0.4 MG: 1 INJECTION, SOLUTION INTRAMUSCULAR; INTRAVENOUS; SUBCUTANEOUS at 11:12

## 2025-05-02 RX ADMIN — ROCURONIUM BROMIDE 20 MG: 10 INJECTION INTRAVENOUS at 09:09

## 2025-05-02 RX ADMIN — DEXAMETHASONE SODIUM PHOSPHATE 10 MG: 4 INJECTION, SOLUTION INTRA-ARTICULAR; INTRALESIONAL; INTRAMUSCULAR; INTRAVENOUS; SOFT TISSUE at 09:02

## 2025-05-02 RX ADMIN — SUCCINYLCHOLINE CHLORIDE 100 MG: 20 INJECTION, SOLUTION INTRAMUSCULAR; INTRAVENOUS at 08:56

## 2025-05-02 RX ADMIN — ACETAMINOPHEN 975 MG: 325 TABLET, FILM COATED ORAL at 13:42

## 2025-05-02 RX ADMIN — FENTANYL CITRATE 50 MCG: 50 INJECTION, SOLUTION INTRAMUSCULAR; INTRAVENOUS at 08:56

## 2025-05-02 RX ADMIN — KETOROLAC TROMETHAMINE 30 MG: 30 INJECTION, SOLUTION INTRAMUSCULAR; INTRAVENOUS at 09:40

## 2025-05-02 SDOH — SOCIAL STABILITY: SOCIAL INSECURITY
WITHIN THE LAST YEAR, HAVE YOU BEEN KICKED, HIT, SLAPPED, OR OTHERWISE PHYSICALLY HURT BY YOUR PARTNER OR EX-PARTNER?: NO

## 2025-05-02 SDOH — SOCIAL STABILITY: SOCIAL INSECURITY: WITHIN THE LAST YEAR, HAVE YOU BEEN HUMILIATED OR EMOTIONALLY ABUSED IN OTHER WAYS BY YOUR PARTNER OR EX-PARTNER?: NO

## 2025-05-02 SDOH — SOCIAL STABILITY: SOCIAL INSECURITY
WITHIN THE LAST YEAR, HAVE YOU BEEN RAPED OR FORCED TO HAVE ANY KIND OF SEXUAL ACTIVITY BY YOUR PARTNER OR EX-PARTNER?: NO

## 2025-05-02 SDOH — SOCIAL STABILITY: SOCIAL INSECURITY: WITHIN THE LAST YEAR, HAVE YOU BEEN AFRAID OF YOUR PARTNER OR EX-PARTNER?: NO

## 2025-05-02 SDOH — SOCIAL STABILITY: SOCIAL INSECURITY: VERBAL ABUSE: DENIES

## 2025-05-02 SDOH — HEALTH STABILITY: MENTAL HEALTH: SUICIDAL BEHAVIOR (LIFETIME): NO

## 2025-05-02 SDOH — SOCIAL STABILITY: SOCIAL INSECURITY: DO YOU FEEL ANYONE HAS EXPLOITED OR TAKEN ADVANTAGE OF YOU FINANCIALLY OR OF YOUR PERSONAL PROPERTY?: NO

## 2025-05-02 SDOH — HEALTH STABILITY: MENTAL HEALTH: NON-SPECIFIC ACTIVE SUICIDAL THOUGHTS (PAST 1 MONTH): NO

## 2025-05-02 SDOH — SOCIAL STABILITY: SOCIAL INSECURITY: PHYSICAL ABUSE: DENIES

## 2025-05-02 SDOH — ECONOMIC STABILITY: FOOD INSECURITY
WITHIN THE PAST 12 MONTHS, YOU WORRIED THAT YOUR FOOD WOULD RUN OUT BEFORE YOU GOT THE MONEY TO BUY MORE.: PATIENT DECLINED

## 2025-05-02 SDOH — HEALTH STABILITY: MENTAL HEALTH: WISH TO BE DEAD (PAST 1 MONTH): NO

## 2025-05-02 SDOH — SOCIAL STABILITY: SOCIAL INSECURITY: ABUSE SCREEN: ADULT

## 2025-05-02 SDOH — ECONOMIC STABILITY: HOUSING INSECURITY: DO YOU FEEL UNSAFE GOING BACK TO THE PLACE WHERE YOU ARE LIVING?: NO

## 2025-05-02 SDOH — SOCIAL STABILITY: SOCIAL INSECURITY: HAS ANYONE EVER THREATENED TO HURT YOUR FAMILY OR YOUR PETS?: NO

## 2025-05-02 SDOH — HEALTH STABILITY: MENTAL HEALTH: CURRENT SMOKER: 0

## 2025-05-02 SDOH — ECONOMIC STABILITY: FOOD INSECURITY: WITHIN THE PAST 12 MONTHS, THE FOOD YOU BOUGHT JUST DIDN'T LAST AND YOU DIDN'T HAVE MONEY TO GET MORE.: PATIENT DECLINED

## 2025-05-02 SDOH — SOCIAL STABILITY: SOCIAL INSECURITY: HAVE YOU HAD ANY THOUGHTS OF HARMING ANYONE ELSE?: NO

## 2025-05-02 SDOH — SOCIAL STABILITY: SOCIAL INSECURITY: HAVE YOU HAD THOUGHTS OF HARMING ANYONE ELSE?: NO

## 2025-05-02 SDOH — SOCIAL STABILITY: SOCIAL INSECURITY: DOES ANYONE TRY TO KEEP YOU FROM HAVING/CONTACTING OTHER FRIENDS OR DOING THINGS OUTSIDE YOUR HOME?: NO

## 2025-05-02 SDOH — SOCIAL STABILITY: SOCIAL INSECURITY: ARE THERE ANY APPARENT SIGNS OF INJURIES/BEHAVIORS THAT COULD BE RELATED TO ABUSE/NEGLECT?: NO

## 2025-05-02 SDOH — SOCIAL STABILITY: SOCIAL INSECURITY: ARE YOU OR HAVE YOU BEEN THREATENED OR ABUSED PHYSICALLY, EMOTIONALLY, OR SEXUALLY BY ANYONE?: NO

## 2025-05-02 SDOH — HEALTH STABILITY: MENTAL HEALTH: WERE YOU ABLE TO COMPLETE ALL THE BEHAVIORAL HEALTH SCREENINGS?: YES

## 2025-05-02 ASSESSMENT — PAIN DESCRIPTION - DESCRIPTORS
DESCRIPTORS: CRAMPING
DESCRIPTORS: CRAMPING;BURNING
DESCRIPTORS: CRAMPING

## 2025-05-02 ASSESSMENT — PATIENT HEALTH QUESTIONNAIRE - PHQ9
1. LITTLE INTEREST OR PLEASURE IN DOING THINGS: NOT AT ALL
SUM OF ALL RESPONSES TO PHQ9 QUESTIONS 1 & 2: 0
2. FEELING DOWN, DEPRESSED OR HOPELESS: NOT AT ALL

## 2025-05-02 ASSESSMENT — PAIN SCALES - GENERAL
PAINLEVEL_OUTOF10: 8
PAINLEVEL_OUTOF10: 8
PAINLEVEL_OUTOF10: 0 - NO PAIN
PAINLEVEL_OUTOF10: 2
PAINLEVEL_OUTOF10: 8
PAINLEVEL_OUTOF10: 8
PAIN_LEVEL: 3
PAINLEVEL_OUTOF10: 8
PAINLEVEL_OUTOF10: 0 - NO PAIN

## 2025-05-02 ASSESSMENT — LIFESTYLE VARIABLES
AUDIT-C TOTAL SCORE: 0
AUDIT-C TOTAL SCORE: 0
HOW OFTEN DO YOU HAVE 6 OR MORE DRINKS ON ONE OCCASION: NEVER
HOW OFTEN DO YOU HAVE A DRINK CONTAINING ALCOHOL: NEVER
HOW MANY STANDARD DRINKS CONTAINING ALCOHOL DO YOU HAVE ON A TYPICAL DAY: PATIENT DOES NOT DRINK
SKIP TO QUESTIONS 9-10: 1

## 2025-05-02 ASSESSMENT — ACTIVITIES OF DAILY LIVING (ADL): LACK_OF_TRANSPORTATION: PATIENT DECLINED

## 2025-05-02 NOTE — ANESTHESIA PROCEDURE NOTES
Airway  Date/Time: 5/2/2025 8:58 AM  Reason: elective    Airway not difficult    Staffing  Performed: ИВАН   Authorized by: Genoveva Roberto MD    Performed by: BETTY De Leon-HASMUKH  Patient location during procedure: OR    Patient Condition  Indications for airway management: anesthesia  Patient position: sniffing  MILS maintained throughout  Sedation level: deep     Final Airway Details   Preoxygenated: yes  Final airway type: endotracheal airway  Successful airway: ETT  Cuffed: yes   Successful intubation technique: video laryngoscopy  Adjuncts used in placement: intubating stylet  Blade: Shiela  Blade size: #3  ETT size (mm): 6.5  Cormack-Lehane Classification: grade I - full view of glottis  Placement verified by: chest auscultation and capnometry   Measured from: lips  ETT to lips (cm): 22  Number of attempts at approach: 1    Additional Comments  Rsi with cricoid

## 2025-05-02 NOTE — ANESTHESIA PREPROCEDURE EVALUATION
Patient: Michelle Higgins    Evaluation Method: In-person visit    Procedure Information       Date/Time: 05/02/25 6753    Procedure: DILATION AND EVACUATION, UTERUS - D&E @ 20 wks for IUFD    Location: [facility] / [facility]    Surgeons: Vania Mclaughlin MD            Relevant Problems   Anesthesia (within normal limits)      Cardiac (within normal limits)      Pulmonary (within normal limits)      Neuro (within normal limits)      GI (within normal limits)      /Renal (within normal limits)      Liver (within normal limits)      Endocrine (within normal limits)      Musculoskeletal   (+) Scoliosis       Clinical information reviewed:    Allergies                NPO Detail:  No data recorded     OB/GYN     Physical Exam    Airway  Mallampati: I  TM distance: >3 FB  Neck ROM: full  Mouth opening: 3 or more finger widths     Cardiovascular    Dental    Pulmonary    Abdominal            Anesthesia Plan    History of general anesthesia?: yes  History of complications of general anesthesia?: no    ASA 2     general     The patient is not a current smoker.    intravenous induction   Anesthetic plan and risks discussed with patient.  Use of blood products discussed with patient who.

## 2025-05-02 NOTE — OP NOTE
Date: 2025  [place] [facility] [place] [facility]    Name: Michelle Higgins, : 1990, Age: 34 y.o., MRN: [id number], Sex: female    Diagnosis  Pre-op Diagnosis      * Missed  (HHS-HCC) [O02.1] Post-op Diagnosis     * Missed  (HHS-HCC) [O02.1]     Procedures  DILATION AND EVACUATION, UTERUS  26497 - WA TX MISSED  [facility]      Surgeons      * Vania Mclaughlin - Primary    Resident/Fellow/Other Assistant:  Surgeons and Role:     * Yaritza Dumont MD - Resident - Assisting     * Nidia Phillips MD - Resident - Assisting    Staff:   Relief Circulator:   Relief Scrub:   Circulator Orientee:   Judeulator: Agustina Dunne Person: Barbara    Anesthesia Staff: Anesthesiologist: Genoveva Roberto MD  CRNA: BETTY De Leon-HASMUKH  ИВАН: Sharon Cruz    Procedure Summary  Anesthesia: General  ASA: II  Estimated Blood Loss: 500mL  Intra-op Medications: Administrations occurring from 0730 to 0925 on 25:  * No intraprocedure medications in log *           Anesthesia Record               Intraprocedure I/O Totals          Intake    LR bolus 500.00 mL    Oxytocin Drip 0.00 mL    The total shown is the total volume documented since Anesthesia Start was filed.    Total Intake 500 mL       Output    Total Blood Loss - Surgical Delivery (mL) 500 mL    Total Output 500 mL       Net    Net Volume 0 mL       Other (could not be determined as input or output)    Surgical Delivery Estimated Blood Loss (mL) 500          Specimen: Products of conception       Procedure Details:  The patient was seen in the preoperative area. The site of surgery was properly noted/marked if necessary per policy. The patient has been actively warmed in preoperative area. Preoperative antibiotics have been ordered and given within 1 hours of procedure. Venous thrombosis prophylaxis have been ordered including bilateral sequential compression devices    Findings: Demised fetus approximately 17-18wks in size, pale,  grossly normal appearing.    The patient was taken to the operating room and placed in the dorsal supine position on the operating table. General endotracheal anesthesia was administered uneventfully. She was then placed in dorsal lithotomy position. Exam under anesthesia was performed. The gauze pad and 8 laminaria were removed.    The cervix was noted to be approximately 3 cm dilated. She was prepped and draped in normal sterile fashion as per protocol for this procedure.  A speculum was placed into the vagina.  The anterior lip of the cervix was grasped with a ring forceps. At that point, the amniotic sac was ruptured using a suction curette. Under ultrasound guidance, the Sopher forceps were used to extract all of the fetal and placental tissue in serial passes.  After this was completed, a 27 mm curved suction curette was inserted into the uterine cavity and the cavity was evacuated of all remaining blood and products of conception under ultrasound guidance.  At that point, the endometrial echo appeared thin consistent with a complete . The uterine tone and bleeding were appropriate. All instruments were removed from the uterus, cervix, and vagina. The products of conception were evaluated and the presence of the calvarium, thorax/spine, extremities x4, and an appropriate amount of fetal and placental tissue were confirmed. The tissue was collected for pathologic and genetic evaluation.    All counts were correct. The specimen was sent to pathology and genetics. Dr. Mclaughlin was present for the entire procedure. The patient was taken from the operating room in stable condition after reversal of anesthesia to a recovery room.      Complications:  None; patient tolerated the procedure well.     Disposition: L&D - hemodynamically stable.  Condition: stable

## 2025-05-02 NOTE — H&P
OB Admission H&P    Assessment/Plan    Michelle Higgins is a 34 y.o.  presenting for D&E in s/o IUFD measuring 17w5d.    Plan  - Diagnosed with an intrauterine embryonic/fetal demise on 25 measuring 17w5d GA by ultrasound.  - Desires D&E under GA  - 8 laminaria and 1 gauze placed  for cervical dilation  - T&S active  - Starting hgb 9.0, T&Cx1u  - Desires genetics and placental pathology; declines autopsy    Patient desires:  [x] Hospital disposition   [x] Patient desires to be notified of annual [facility] service - Parent Bereavement Programs notified  [] Private disposition - Authorization for Release form signed with patient and uploaded to patient's chart  [] Patient desires a fetal death certificate  A pregnancy loss of less than 20 weeks can be recognized in the [facility] with a Fetal Death Certificate.  You or the father of the Baby may apply for a Fetal Death Certificate through the [facility].  This written statement of early pregnancy loss must be submitted with the application.  There may be a fee associated with this service.  Other requirements may apply.      Seen and discussed w/ Dr. Lissette Phillips MD pGY-2  Electronically signed to expedite processing.        Pregnancy Problems (from 25 to present)       Problem Noted Diagnosed Resolved    Labor and delivery indication for care or intervention ([facility]) 2025 by Rupa Walton MD  No    Priority:  Medium               Subjective   Patient states she had some cramping last night but otherwise feels well. The laminaria and gauze stayed in her vagina. She denies any bleeding or leakage of fluid.    OB History    Para Term  AB Living   2 1 1 0 0 1   SAB IAB Ectopic Multiple Live Births   0 0 0 0 1      # Outcome Date GA Lbr Jaskaran/2nd Weight Sex Type Anes PTL Lv   2 Current            1 Term 17 39w3d  3.912 kg F Vag-Spont EPI N SABI      Birth Comments: Mother A+, GBS  negativeNBS WNL      Name: BG PRASHANTH PEDERSEN      Apgar1: 8  Apgar5: 9       Surgical History[1]    Social History     Tobacco Use    Smoking status: Never    Smokeless tobacco: Never   Substance Use Topics    Alcohol use: Not Currently     Alcohol/week: 1.0 standard drink of alcohol     Types: 1 Glasses of wine per week     Comment: currently pregnant       Allergies[2]    Prescriptions Prior to Admission[3]  Objective     Last Vitals  Temp Pulse Resp BP MAP O2 Sat   36.5 °C (97.7 °F) 82 16 110/66 82 100 %     Blood Pressures         5/2/2025  0607             BP: 110/66             Physical Exam  General: NAD, mood appropriate  Cardiopulmonary: warm and well perfused, breathing comfortably on room air  Abdomen: Gravid, non-tender  Extremities: Symmetric      Results from last 7 days   Lab Units 05/01/25  1415   WBC AUTO x10*3/uL 6.8   HEMOGLOBIN g/dL 9.0*   HEMATOCRIT % 29.4*   PLATELETS AUTO x10*3/uL 271                 [1] No past surgical history on file.  [2]   Allergies  Allergen Reactions    Percocet [Oxycodone-Acetaminophen] Itching   [3]   Facility-Administered Medications Prior to Admission   Medication Dose Route Frequency Provider Last Rate Last Admin    lidocaine (Xylocaine) 10 mg/mL (1 %) injection 20 mL  20 mL infiltration Once Ute Walls MD         Medications Prior to Admission   Medication Sig Dispense Refill Last Dose/Taking    prenatal no115/iron/folic acid (PRENATAL 19 ORAL) Take by mouth.       promethazine (Phenergan) 25 mg tablet Take 1 tablet (25 mg) by mouth every 6 hours if needed for nausea or vomiting. 30 tablet 1

## 2025-05-02 NOTE — ANESTHESIA POSTPROCEDURE EVALUATION
Patient: Michelle Higgins    Procedure Summary       Date: 25 Room / Location: [facility] / [facility]    Anesthesia Start: 846 Anesthesia Stop: 957    Procedure: DILATION AND EVACUATION, UTERUS Diagnosis:       Missed  (HHS-HCC)      (Missed  (HHS-HCC) [O02.1])    Surgeons: Vania Mclaughlin MD Responsible Provider: Genoveva Roberto MD    Anesthesia Type: general ASA Status: 2            Anesthesia Type: general    Vitals Value Taken Time   /71 25 09:55   Temp 36.5 °C (97.7 °F) 25 09:55   Pulse 110 25 09:56   Resp 18 25 09:58   SpO2 100 % 25 09:56       Anesthesia Post Evaluation    Patient location during evaluation: bedside  Patient participation: complete - patient participated  Level of consciousness: awake and alert  Pain score: 3  Pain management: adequate  Airway patency: patent  Cardiovascular status: acceptable and hemodynamically stable  Respiratory status: acceptable and room air  Hydration status: acceptable  Postoperative Nausea and Vomiting: none        There were no known notable events for this encounter.

## 2025-05-02 NOTE — LETTER
May 2, 2025     Patient: Michelle Higgins   YOB: 1990   Date of Visit: 4/30/2025       To Whom It May Concern:    Michelle Higgins was seen in our hospital on 5/2/2025 for a medical procedure. Please excuse Michelle for her absence from work on this day as well as the next 6 weeks for recovery.    If you have any questions or concerns, please don't hesitate to call.         Sincerely,       Yaritza Dumont MD      CC: No Recipients

## 2025-05-04 LAB
BLOOD EXPIRATION DATE: NORMAL
DISPENSE STATUS: NORMAL
PRODUCT BLOOD TYPE: 6200
PRODUCT CODE: NORMAL
UNIT ABO: NORMAL
UNIT NUMBER: NORMAL
UNIT RH: NORMAL
UNIT VOLUME: 350
XM INTEP: NORMAL

## 2025-05-05 LAB
DRVVT SCREEN TO CONFIRM RATIO: 0.75 RATIO
DRVVT/DRVVT CFM NRMLZD PPP-RTO: 0.87 RATIO
DRVVT/DRVVT CFM P DOAC NEUT NORM PPP-RTO: 0.85 RATIO
LA 2 SCREEN W REFLEX-IMP: NORMAL
NORMALIZED SCT PPP-RTO: 0.73 RATIO
SILICA CLOTTING TIME CONFIRMATION: 0.85 RATIO
SILICA CLOTTING TIME SCREEN: 0.62 RATIO

## 2025-05-06 ENCOUNTER — TELEPHONE (OUTPATIENT)
Dept: OBSTETRICS AND GYNECOLOGY | Facility: CLINIC | Age: 35
End: 2025-05-06
Payer: COMMERCIAL

## 2025-05-06 DIAGNOSIS — D64.9 ANEMIA, UNSPECIFIED TYPE: Primary | ICD-10-CM

## 2025-05-06 LAB
AUTOPSY REPORT: NORMAL
LABORATORY COMMENT REPORT: NORMAL
PATH REPORT.COMMENTS IMP SPEC: NORMAL
PATH REPORT.FINAL DX SPEC: NORMAL
PATH REPORT.GROSS SPEC: NORMAL
PATH REPORT.RELEVANT HX SPEC: NORMAL
PATH REPORT.RELEVANT HX SPEC: NORMAL
PATH REPORT.TOTAL CANCER: NORMAL
PATH REPORT.TOTAL CANCER: NORMAL
RESIDENT REVIEW: NORMAL

## 2025-05-06 NOTE — TELEPHONE ENCOUNTER
Est pt s/p D&E calling stating last night she developed low grade fever 100 with chills and flu like sx's. Pt states today she no longer has those sx's but is extremely tired. Pt also states bleeding is the same no increase. Advised to contact on call if needed after hours and to monitor sx's.

## 2025-05-06 NOTE — PROGRESS NOTES
25: Received referral for bereavement support for Michelle following the loss of her baby girl. Michelle is a 34 y.o.  presenting for D&E in s/o IUFD measuring 17w5d.   Met with Michelle after her procedure on L&D. Michelle's s.o. and her aunt were present with her. Introduced self and Bereavement Programs. Also introduced  Matt who was present for the conversation. Per Michelle, she hasn't been able to start processing her grief as she was waiting for the procedure to be over. Spoke with Michelle about grief resources available both in person and virtually. Michelle asked about potential resources for her 7 yo daughter at home. Spoke with them about resources for her daughter. Provided them with a few pregnancy loss children's books in addition to a children's grief journal and a Joseph Bear.   Provided them all with the contact information for the [facility]. No further needs identified at this time.    Will remain available for bereavement support/resources prn.    Valerie Thompson, EBER, JU[employer] Director of Parent Bereavement Programs  [phone]

## 2025-05-07 LAB
DNA VOLUME: 75 ΜL
DNA YIELD: 39.96 ΜG
ELECTRONICALLY SIGNED BY CYTOGENETICS: NORMAL

## 2025-05-07 NOTE — TELEPHONE ENCOUNTER
Will add on cbc w/diff for pt as has some anemia before procedure; encourage pt to take po iron/multivitamin as well; make sure she's doing ok

## 2025-05-08 ENCOUNTER — TELEPHONE (OUTPATIENT)
Dept: OBSTETRICS AND GYNECOLOGY | Facility: CLINIC | Age: 35
End: 2025-05-08
Payer: COMMERCIAL

## 2025-05-09 ENCOUNTER — TELEPHONE (OUTPATIENT)
Dept: OBSTETRICS AND GYNECOLOGY | Facility: CLINIC | Age: 35
End: 2025-05-09
Payer: COMMERCIAL

## 2025-05-09 NOTE — TELEPHONE ENCOUNTER
Spoke with patient regarding dr. Mendoza's message about ordering medication to stop breast milk productions.  Patient will reach out to dr. Howard office .

## 2025-05-09 NOTE — TELEPHONE ENCOUNTER
Spoke with patient regarding directions to help with breast milk.  Patient was informed by Dr. Mclaughlin at time of surgery that she could get medication to stop breast milk productions.  She is requesting to have medication called to pharmacy.

## 2025-05-14 ENCOUNTER — APPOINTMENT (OUTPATIENT)
Dept: OBSTETRICS AND GYNECOLOGY | Facility: CLINIC | Age: 35
End: 2025-05-14
Payer: COMMERCIAL

## 2025-05-15 LAB
BASOPHILS # BLD AUTO: 42 CELLS/UL (ref 0–200)
BASOPHILS NFR BLD AUTO: 0.8 %
EOSINOPHIL # BLD AUTO: 62 CELLS/UL (ref 15–500)
EOSINOPHIL NFR BLD AUTO: 1.2 %
ERYTHROCYTE [DISTWIDTH] IN BLOOD BY AUTOMATED COUNT: 13.7 % (ref 11–15)
HCT VFR BLD AUTO: 30.1 % (ref 35–45)
HGB BLD-MCNC: 9.2 G/DL (ref 11.7–15.5)
LYMPHOCYTES # BLD AUTO: 2834 CELLS/UL (ref 850–3900)
LYMPHOCYTES NFR BLD AUTO: 54.5 %
MCH RBC QN AUTO: 27.4 PG (ref 27–33)
MCHC RBC AUTO-ENTMCNC: 30.6 G/DL (ref 32–36)
MCV RBC AUTO: 89.6 FL (ref 80–100)
MONOCYTES # BLD AUTO: 437 CELLS/UL (ref 200–950)
MONOCYTES NFR BLD AUTO: 8.4 %
NEUTROPHILS # BLD AUTO: 1825 CELLS/UL (ref 1500–7800)
NEUTROPHILS NFR BLD AUTO: 35.1 %
PLATELET # BLD AUTO: 383 THOUSAND/UL (ref 140–400)
PMV BLD REES-ECKER: 10.3 FL (ref 7.5–12.5)
RBC # BLD AUTO: 3.36 MILLION/UL (ref 3.8–5.1)
WBC # BLD AUTO: 5.2 THOUSAND/UL (ref 3.8–10.8)

## 2025-05-20 ENCOUNTER — OFFICE VISIT (OUTPATIENT)
Dept: OBSTETRICS AND GYNECOLOGY | Facility: CLINIC | Age: 35
End: 2025-05-20
Payer: COMMERCIAL

## 2025-05-20 VITALS
HEART RATE: 86 BPM | DIASTOLIC BLOOD PRESSURE: 68 MMHG | BODY MASS INDEX: 21.82 KG/M2 | WEIGHT: 139 LBS | HEIGHT: 67 IN | SYSTOLIC BLOOD PRESSURE: 104 MMHG

## 2025-05-20 DIAGNOSIS — Z63.4 GRIEF AT LOSS OF CHILD: ICD-10-CM

## 2025-05-20 DIAGNOSIS — F43.21 GRIEF AT LOSS OF CHILD: ICD-10-CM

## 2025-05-20 DIAGNOSIS — Z48.89 ENCOUNTER FOR POSTOPERATIVE CARE: Primary | ICD-10-CM

## 2025-05-20 PROCEDURE — 99211 OFF/OP EST MAY X REQ PHY/QHP: CPT | Performed by: OBSTETRICS & GYNECOLOGY

## 2025-05-20 PROCEDURE — 3008F BODY MASS INDEX DOCD: CPT | Performed by: OBSTETRICS & GYNECOLOGY

## 2025-05-20 PROCEDURE — 1036F TOBACCO NON-USER: CPT | Performed by: OBSTETRICS & GYNECOLOGY

## 2025-05-20 SDOH — SOCIAL STABILITY - SOCIAL INSECURITY: DISSAPEARANCE AND DEATH OF FAMILY MEMBER: Z63.4

## 2025-05-20 ASSESSMENT — ENCOUNTER SYMPTOMS
OCCASIONAL FEELINGS OF UNSTEADINESS: 0
DEPRESSION: 0
LOSS OF SENSATION IN FEET: 0

## 2025-05-20 ASSESSMENT — PATIENT HEALTH QUESTIONNAIRE - PHQ9
2. FEELING DOWN, DEPRESSED OR HOPELESS: SEVERAL DAYS
10. IF YOU CHECKED OFF ANY PROBLEMS, HOW DIFFICULT HAVE THESE PROBLEMS MADE IT FOR YOU TO DO YOUR WORK, TAKE CARE OF THINGS AT HOME, OR GET ALONG WITH OTHER PEOPLE: SOMEWHAT DIFFICULT
SUM OF ALL RESPONSES TO PHQ9 QUESTIONS 1 & 2: 1
1. LITTLE INTEREST OR PLEASURE IN DOING THINGS: NOT AT ALL

## 2025-05-20 ASSESSMENT — EDINBURGH POSTNATAL DEPRESSION SCALE (EPDS)
I HAVE LOOKED FORWARD WITH ENJOYMENT TO THINGS: RATHER LESS THAN I USED TO
I HAVE BEEN ABLE TO LAUGH AND SEE THE FUNNY SIDE OF THINGS: NOT QUITE SO MUCH NOW
I HAVE FELT SCARED OR PANICKY FOR NO GOOD REASON: NO, NOT MUCH
TOTAL SCORE: 16
I HAVE FELT SAD OR MISERABLE: YES, MOST OF THE TIME
I HAVE BLAMED MYSELF UNNECESSARILY WHEN THINGS WENT WRONG: YES, SOME OF THE TIME
I HAVE BEEN ANXIOUS OR WORRIED FOR NO GOOD REASON: YES, SOMETIMES
I HAVE BEEN SO UNHAPPY THAT I HAVE BEEN CRYING: ONLY OCCASIONALLY
I HAVE BEEN SO UNHAPPY THAT I HAVE HAD DIFFICULTY SLEEPING: YES, MOST OF THE TIME
THINGS HAVE BEEN GETTING ON TOP OF ME: YES, SOMETIMES I HAVEN'T BEEN COPING AS WELL AS USUAL
THE THOUGHT OF HARMING MYSELF HAS OCCURRED TO ME: NEVER

## 2025-05-20 ASSESSMENT — LIFESTYLE VARIABLES
AUDIT-C TOTAL SCORE: 0
HOW MANY STANDARD DRINKS CONTAINING ALCOHOL DO YOU HAVE ON A TYPICAL DAY: PATIENT DOES NOT DRINK
HOW OFTEN DO YOU HAVE SIX OR MORE DRINKS ON ONE OCCASION: NEVER
HOW OFTEN DO YOU HAVE A DRINK CONTAINING ALCOHOL: NEVER
SKIP TO QUESTIONS 9-10: 1

## 2025-05-20 ASSESSMENT — PAIN SCALES - GENERAL: PAINLEVEL_OUTOF10: 1

## 2025-05-20 NOTE — PROGRESS NOTES
"Subjective     Michelle Higgins is a 34 y.o. female who presents to the clinic 2-3 weeks postop  -Procedure Dilation &Evacuation for fetal demise at 20wks, done at  [facility].   -Pathology: rev'd, autopsy pend  -Eating a regular diet without difficulty.   -Bowel movements are normal.   -The patient is having minimal low back pain  -Pt reports she is doing the best she can emotionally, deep waves of sadness, feeling like body betrayed her present. Has a therapist and has been on medication in past, had ppd after first child. Has been trying to process but feels a little stuck. EPDS 16  -Milk did come in but has decreased, not leaking anymore    Objective   /68   Pulse 86   Ht 1.702 m (5' 7\")   Wt 63 kg (139 lb)   LMP 12/07/2024   Breastfeeding No   BMI 21.77 kg/m²   Physical Exam  Constitutional:       Appearance: Normal appearance.   HENT:      Head: Normocephalic and atraumatic.   Skin:     General: Skin is warm and dry.   Neurological:      General: No focal deficit present.      Mental Status: She is alert.   Psychiatric:         Attention and Perception: Attention and perception normal.         Mood and Affect: Mood is depressed. Affect is tearful.         Speech: Speech normal.         Behavior: Behavior normal. Behavior is cooperative.         Thought Content: Thought content does not include homicidal or suicidal ideation.         Judgment: Judgment normal.            Assessment/Plan    Problem List Items Addressed This Visit    None  Visit Diagnoses         Codes      Encounter for postoperative care    -  Primary Z48.89      Grief at loss of child     F43.21, Z63.4        Support offered, reassurance given; therapy encouraged. Pt will opt for medication (prev on lexapro, didn't really feel so unsure if she wants that one) if still deeply saddened at nv    Doing well postoperatively.  Operative findings again reviewed. Pathology report discussed.  1. Continue any current medications.  2. Activity " restrictions: none  3. Follow up: 4 weeks

## 2025-05-21 LAB
COMMENTS - MP RESULT TYPE: NORMAL
SCAN RESULT: NORMAL

## 2025-05-29 ENCOUNTER — DOCUMENTATION (OUTPATIENT)
Dept: GENETICS | Facility: HOSPITAL | Age: 35
End: 2025-05-29
Payer: COMMERCIAL

## 2025-05-29 ENCOUNTER — TELEPHONE (OUTPATIENT)
Dept: GENETICS | Facility: HOSPITAL | Age: 35
End: 2025-05-29
Payer: COMMERCIAL

## 2025-05-29 NOTE — PROGRESS NOTES
The following information was discussed with the patient and her partner by phone on 5/29/2025:    The patient's POC WGS results (see below):      The whole genome sequencing did not identified any pathogenic or likely pathogenic variants associated with the reported clinical symptoms in the fetus.     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  Pathogenic (disease causing) changes in this gene are associated with an autosomal dominant condition associated with retinoblastoma and other cancers.  We reviewed that this change was found to be de glen (a new finding) in the fetus and that neither the patient or her partner was found to have this variant of uncertain significance in the RB1 gene.  The recurrence risk to future offspring of the couple for this VUS is low (<5%) but greater than that of the general population because of the possibility of parental germline mosaicism.      Because the whole genome sequencing did not find an underlying genetic etiology for the fetal demise, we cannot provide a recurrence risk for a future pregnancy. The patient understands that non-diagnostic genetic testing does not rule out all genetic disorders as certain types of gene changes may not be detectable by this test and some of the genes that can cause particular symptoms may not have been identified yet (or are not well understood).     There is the option for a re-analysis of the data in the future or research studies in the future. The patient should contact our office back if she elects to pursue any of these options in the future.     DISPOSITION:  The patient may consider following up with genetics every few years to determine if anything has changed with the RB1 variant classification.   The patient may contact Bhargavi Simons MS [facility] directly at [phone].   The patient verbalized understanding of the information above.  We remain available should questions arise.      Bhargavi Simons MS  Licensed Genetic Counselor

## 2025-05-29 NOTE — TELEPHONE ENCOUNTER
I called the patient to review her POC whole genome sequencing results.  I left a voicemail for the patient to call me back.    Bhargavi Simons MS  Licensed Genetic Counselor

## 2025-06-09 ENCOUNTER — TELEMEDICINE (OUTPATIENT)
Dept: OBSTETRICS AND GYNECOLOGY | Facility: CLINIC | Age: 35
End: 2025-06-09
Payer: COMMERCIAL

## 2025-06-09 DIAGNOSIS — N94.6 DYSMENORRHEA: ICD-10-CM

## 2025-06-09 DIAGNOSIS — Z63.4 GRIEF AT LOSS OF CHILD: ICD-10-CM

## 2025-06-09 DIAGNOSIS — N92.0 MENORRHAGIA WITH REGULAR CYCLE: Primary | ICD-10-CM

## 2025-06-09 DIAGNOSIS — F43.21 GRIEF AT LOSS OF CHILD: ICD-10-CM

## 2025-06-09 PROCEDURE — 99214 OFFICE O/P EST MOD 30 MIN: CPT | Performed by: OBSTETRICS & GYNECOLOGY

## 2025-06-09 PROCEDURE — 1036F TOBACCO NON-USER: CPT | Performed by: OBSTETRICS & GYNECOLOGY

## 2025-06-09 RX ORDER — TRANEXAMIC ACID 650 MG/1
1300 TABLET ORAL 3 TIMES DAILY
Qty: 30 TABLET | Refills: 2 | Status: SHIPPED | OUTPATIENT
Start: 2025-06-09

## 2025-06-09 RX ORDER — BUPROPION HYDROCHLORIDE 150 MG/1
150 TABLET ORAL DAILY
Qty: 30 TABLET | Refills: 2 | Status: SHIPPED | OUTPATIENT
Start: 2025-06-09 | End: 2026-06-09

## 2025-06-09 RX ORDER — CYCLOBENZAPRINE HCL 10 MG
10 TABLET ORAL 3 TIMES DAILY PRN
Qty: 30 TABLET | Refills: 1 | Status: SHIPPED | OUTPATIENT
Start: 2025-06-09

## 2025-06-09 SDOH — SOCIAL STABILITY - SOCIAL INSECURITY: DISSAPEARANCE AND DEATH OF FAMILY MEMBER: Z63.4

## 2025-06-09 ASSESSMENT — EDINBURGH POSTNATAL DEPRESSION SCALE (EPDS)
I HAVE BEEN ABLE TO LAUGH AND SEE THE FUNNY SIDE OF THINGS: NOT QUITE SO MUCH NOW
I HAVE BEEN SO UNHAPPY THAT I HAVE BEEN CRYING: YES, QUITE OFTEN
THINGS HAVE BEEN GETTING ON TOP OF ME: YES, SOMETIMES I HAVEN'T BEEN COPING AS WELL AS USUAL
I HAVE FELT SCARED OR PANICKY FOR NO GOOD REASON: YES, SOMETIMES
I HAVE FELT SAD OR MISERABLE: YES, QUITE OFTEN
I HAVE BLAMED MYSELF UNNECESSARILY WHEN THINGS WENT WRONG: YES, MOST OF THE TIME
TOTAL SCORE: 18
I HAVE LOOKED FORWARD WITH ENJOYMENT TO THINGS: DEFINITELY LESS THAN I USED TO
I HAVE BEEN SO UNHAPPY THAT I HAVE HAD DIFFICULTY SLEEPING: YES, SOMETIMES
THE THOUGHT OF HARMING MYSELF HAS OCCURRED TO ME: NEVER
I HAVE BEEN ANXIOUS OR WORRIED FOR NO GOOD REASON: YES, SOMETIMES

## 2025-06-09 NOTE — PROGRESS NOTES
Consent:  Verbal consent was requested and obtained from patient on this date for a telehealth visit to determine if a office visit would be necessary for the patient's complaint(s).  Audio was performed due to the unavailability of video technology for the patient to participate face-to-face.    Referring Physician: No referring provider defined for this encounter.  Primary Care Provider: No primary care provider on file.     Subjective    HPI:33yo  , s/p D&E  for fetal demise at 20w3d, approx 5wks post procedure; pt report continued depressed mood, and is wanting to get started on medication at this time.  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  Patient previous to this last pregnancy did have heavy painful menses which was helped with Flexeril and high-dose ibuprofen.  Patient had also been given a prescription for Lysteda which she had not taken yet but does desire this time around.    Medical History[1]     Surgical History[2]     Social History[3]     Current Outpatient Medications   Medication Instructions    buPROPion XL (WELLBUTRIN XL) 150 mg, oral, Daily, Do not crush, chew, or split.    cyclobenzaprine (FLEXERIL) 10 mg, oral, 3 times daily PRN    FeroSuL 325 mg, oral, Daily with breakfast    ibuprofen 600 mg, oral, Every 6 hours PRN    prenatal no115/iron/folic acid (PRENATAL 19 ORAL) Take  by mouth.    promethazine (PHENERGAN) 25 mg, oral, Every 6 hours PRN    tranexamic acid (LYSTEDA) 1,300 mg, oral, 3 times daily      Objective    BSA: There is no height or weight on file to calculate BSA.  There were no vitals taken for this visit.     Physical Exam  General: AAOx3 in NAD   Psych: mood and affect are appropiate. Able to consent.     Assessment/Plan         Problem List Items Addressed This Visit    None  Visit Diagnoses         Codes      Menorrhagia with regular cycle    -  Primary N92.0    Relevant Medications    cyclobenzaprine (Flexeril) 10 mg tablet    tranexamic acid (Lysteda) 650 mg tablet      Dysmenorrhea     N94.6    Relevant Medications    cyclobenzaprine (Flexeril) 10 mg tablet      Grief at loss of child     F43.21, Z63.4    Relevant Medications    buPROPion XL (Wellbutrin XL) 150 mg 24 hr tablet      Postpartum depression     F53.0    Relevant Medications    buPROPion XL (Wellbutrin XL) 150 mg 24 hr tablet        Aware of side effects of medication, including GI/fogginess/dizziness/ha/suicidality/NMS/EPS. Pt expressed understanding, and agrees. Will cont counseling.      Treatment Plans         All new and/or current medications discussed and reviewed including side effects with patient/caregiver, Understanding:  Caregiver/Patient expressed understanding., Adherence:  Barriers to adherence identified and discussed if present.                    [1]   Past Medical History:  Diagnosis Date    Anemia     Depression     Scoliosis    [2] No past surgical history on file.  [3]   Social History  Tobacco Use    Smoking status: Never    Smokeless tobacco: Never   Vaping Use    Vaping status: Never Used   Substance Use Topics    Alcohol use: Not Currently     Alcohol/week: 1.0 standard drink of alcohol     Types: 1 Glasses of wine per week    Drug use: Not Currently

## 2025-06-10 LAB
LABORATORY COMMENT REPORT: NORMAL
PATH REPORT.FINAL DX SPEC: NORMAL
PATH REPORT.GROSS SPEC: NORMAL
PATH REPORT.MICROSCOPIC SPEC OTHER STN: NORMAL
PATH REPORT.RELEVANT HX SPEC: NORMAL
PATH REPORT.RELEVANT HX SPEC: NORMAL
PATH REPORT.TOTAL CANCER: NORMAL
RESIDENT REVIEW: NORMAL
RPT COMMENT SECTION: NORMAL

## 2025-06-12 ENCOUNTER — APPOINTMENT (OUTPATIENT)
Dept: OBSTETRICS AND GYNECOLOGY | Facility: CLINIC | Age: 35
End: 2025-06-12
Payer: COMMERCIAL

## 2025-06-24 LAB
COMMENTS - MP RESULT TYPE: NORMAL
SCAN RESULT: NORMAL

## 2025-06-25 ENCOUNTER — TELEMEDICINE (OUTPATIENT)
Dept: OBSTETRICS AND GYNECOLOGY | Facility: CLINIC | Age: 35
End: 2025-06-25
Payer: COMMERCIAL

## 2025-06-25 DIAGNOSIS — Z63.4 GRIEF AT LOSS OF CHILD: ICD-10-CM

## 2025-06-25 DIAGNOSIS — F43.21 GRIEF AT LOSS OF CHILD: ICD-10-CM

## 2025-06-25 PROCEDURE — 99213 OFFICE O/P EST LOW 20 MIN: CPT | Performed by: OBSTETRICS & GYNECOLOGY

## 2025-06-25 PROCEDURE — 1036F TOBACCO NON-USER: CPT | Performed by: OBSTETRICS & GYNECOLOGY

## 2025-06-25 SDOH — SOCIAL STABILITY - SOCIAL INSECURITY: DISSAPEARANCE AND DEATH OF FAMILY MEMBER: Z63.4

## 2025-06-25 ASSESSMENT — EDINBURGH POSTNATAL DEPRESSION SCALE (EPDS)
I HAVE BLAMED MYSELF UNNECESSARILY WHEN THINGS WENT WRONG: NOT VERY OFTEN
THE THOUGHT OF HARMING MYSELF HAS OCCURRED TO ME: NEVER
I HAVE LOOKED FORWARD WITH ENJOYMENT TO THINGS: RATHER LESS THAN I USED TO
TOTAL SCORE: 12
I HAVE BEEN ABLE TO LAUGH AND SEE THE FUNNY SIDE OF THINGS: NOT QUITE SO MUCH NOW
I HAVE FELT SCARED OR PANICKY FOR NO GOOD REASON: YES, SOMETIMES
I HAVE FELT SAD OR MISERABLE: NOT VERY OFTEN
I HAVE BEEN SO UNHAPPY THAT I HAVE BEEN CRYING: ONLY OCCASIONALLY
I HAVE BEEN ANXIOUS OR WORRIED FOR NO GOOD REASON: YES, SOMETIMES
THINGS HAVE BEEN GETTING ON TOP OF ME: YES, SOMETIMES I HAVEN'T BEEN COPING AS WELL AS USUAL
I HAVE BEEN SO UNHAPPY THAT I HAVE HAD DIFFICULTY SLEEPING: NOT VERY OFTEN

## 2025-06-28 NOTE — PROGRESS NOTES
Consent:  Verbal consent was requested and obtained from patient on this date for a telehealth visit to determine if a office visit would be necessary for the patient's complaint(s).  Audio was performed due to the unavailability of video technology for the patient to participate face-to-face.    Referring Physician: No referring provider defined for this encounter.  Primary Care Provider: No primary care provider on file.     Subjective    HPI: Patient reports she is having ups and downs now since starting the Wellbutrin.  Patient still reports having episodes of sadness but does have elevated mood at times now as well.  When patient does have elevated mood she then feels guilty and then goes on the spiral worst the she then feels worse than she did to start off with.  Patient has been talking this through with her counselor and she is working on some skills to help with that.  Patient reports she has gone out a little bit more than she was doing and she does look forward to things now a little bit more.  Patient at this time does have hope that things will improve.  Patient has been able to take care of herself well and has good support in her family.  EPDS 12, down from 18    Medical History[1]     Surgical History[2]     Social History[3]     Current Outpatient Medications   Medication Instructions    buPROPion XL (WELLBUTRIN XL) 150 mg, oral, Daily, Do not crush, chew, or split.    cyclobenzaprine (FLEXERIL) 10 mg, oral, 3 times daily PRN    FeroSuL 325 mg, oral, Daily with breakfast    ibuprofen 600 mg, oral, Every 6 hours PRN    prenatal no115/iron/folic acid (PRENATAL 19 ORAL) Take by mouth.    promethazine (PHENERGAN) 25 mg, oral, Every 6 hours PRN    tranexamic acid (LYSTEDA) 1,300 mg, oral, 3 times daily      Objective    BSA: There is no height or weight on file to calculate BSA.  There were no vitals taken for this visit.     Physical Exam  General: AAOx3 in NAD   Psych: mood and affect are appropiate.  Able to consent.     Assessment/Plan         Problem List Items Addressed This Visit    None  Visit Diagnoses         Codes      Postpartum depression    -  Primary F53.0      Grief at loss of child     F43.21, Z63.4        Patient encouraged to continue with medication for now we will reassess symptoms again in 1 month as full effects at times take about 6 weeks.  Discussed with patient that it does appear that she is doing better and will write release for her to return to work.     Treatment Plans         All new and/or current medications discussed and reviewed including side effects with patient/caregiver, Understanding:  Caregiver/Patient expressed understanding., Adherence:  Barriers to adherence identified and discussed if present.                  [1]   Past Medical History:  Diagnosis Date    Anemia     Depression     Scoliosis    [2] No past surgical history on file.  [3]   Social History  Tobacco Use    Smoking status: Never    Smokeless tobacco: Never   Vaping Use    Vaping status: Never Used   Substance Use Topics    Alcohol use: Not Currently     Alcohol/week: 1.0 standard drink of alcohol     Types: 1 Glasses of wine per week    Drug use: Not Currently

## 2025-07-02 DIAGNOSIS — F43.21 GRIEF AT LOSS OF CHILD: ICD-10-CM

## 2025-07-02 DIAGNOSIS — Z63.4 GRIEF AT LOSS OF CHILD: ICD-10-CM

## 2025-07-02 RX ORDER — BUPROPION HYDROCHLORIDE 150 MG/1
150 TABLET ORAL DAILY
Qty: 90 TABLET | Refills: 1 | Status: SHIPPED | OUTPATIENT
Start: 2025-07-02 | End: 2026-07-02

## 2025-07-02 SDOH — SOCIAL STABILITY - SOCIAL INSECURITY: DISSAPEARANCE AND DEATH OF FAMILY MEMBER: Z63.4

## 2025-07-10 ENCOUNTER — APPOINTMENT (OUTPATIENT)
Dept: OBSTETRICS AND GYNECOLOGY | Facility: CLINIC | Age: 35
End: 2025-07-10
Payer: COMMERCIAL

## 2025-07-22 ENCOUNTER — APPOINTMENT (OUTPATIENT)
Dept: OBSTETRICS AND GYNECOLOGY | Facility: CLINIC | Age: 35
End: 2025-07-22
Payer: COMMERCIAL

## 2025-08-07 ENCOUNTER — APPOINTMENT (OUTPATIENT)
Dept: OBSTETRICS AND GYNECOLOGY | Facility: CLINIC | Age: 35
End: 2025-08-07
Payer: COMMERCIAL

## 2025-08-20 ENCOUNTER — APPOINTMENT (OUTPATIENT)
Dept: OBSTETRICS AND GYNECOLOGY | Facility: CLINIC | Age: 35
End: 2025-08-20
Payer: COMMERCIAL

## 2025-08-28 ENCOUNTER — APPOINTMENT (OUTPATIENT)
Dept: OBSTETRICS AND GYNECOLOGY | Facility: CLINIC | Age: 35
End: 2025-08-28
Payer: COMMERCIAL

## (undated) DEVICE — BOWL, BASIN, 32 OZ, STERILE

## (undated) DEVICE — DRAPE PACK, LITHOTOMY, CUSTOM, UHC

## (undated) DEVICE — TOWEL PACK, STERILE, 4/PACK, BLUE

## (undated) DEVICE — COVER, LIGHT HANDLE, SURGICAL, RIGID, DISPOSABLE, GREEN, STERILE

## (undated) DEVICE — PREP TRAY, SKIN, DRY, W/GLOVES